# Patient Record
Sex: MALE | Race: WHITE | NOT HISPANIC OR LATINO | Employment: UNEMPLOYED | URBAN - METROPOLITAN AREA
[De-identification: names, ages, dates, MRNs, and addresses within clinical notes are randomized per-mention and may not be internally consistent; named-entity substitution may affect disease eponyms.]

---
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
## 2021-04-13 DIAGNOSIS — Z23 ENCOUNTER FOR IMMUNIZATION: ICD-10-CM

## 2021-05-12 ENCOUNTER — OFFICE VISIT (OUTPATIENT)
Dept: FAMILY MEDICINE CLINIC | Facility: CLINIC | Age: 21
End: 2021-05-12
Payer: COMMERCIAL

## 2021-05-12 VITALS
HEART RATE: 62 BPM | HEIGHT: 70 IN | OXYGEN SATURATION: 99 % | WEIGHT: 143.8 LBS | RESPIRATION RATE: 16 BRPM | TEMPERATURE: 97.3 F | SYSTOLIC BLOOD PRESSURE: 124 MMHG | DIASTOLIC BLOOD PRESSURE: 70 MMHG | BODY MASS INDEX: 20.59 KG/M2

## 2021-05-12 DIAGNOSIS — M79.671 BILATERAL FOOT PAIN: Primary | ICD-10-CM

## 2021-05-12 DIAGNOSIS — M79.672 BILATERAL FOOT PAIN: Primary | ICD-10-CM

## 2021-05-12 PROCEDURE — 3008F BODY MASS INDEX DOCD: CPT | Performed by: FAMILY MEDICINE

## 2021-05-12 PROCEDURE — 3725F SCREEN DEPRESSION PERFORMED: CPT | Performed by: FAMILY MEDICINE

## 2021-05-12 PROCEDURE — 99213 OFFICE O/P EST LOW 20 MIN: CPT | Performed by: FAMILY MEDICINE

## 2021-05-12 PROCEDURE — 1036F TOBACCO NON-USER: CPT | Performed by: FAMILY MEDICINE

## 2021-05-17 ENCOUNTER — IMMUNIZATIONS (OUTPATIENT)
Dept: FAMILY MEDICINE CLINIC | Facility: HOSPITAL | Age: 21
End: 2021-05-17

## 2021-05-17 DIAGNOSIS — Z23 ENCOUNTER FOR IMMUNIZATION: Primary | ICD-10-CM

## 2021-05-17 PROCEDURE — 0011A SARS-COV-2 / COVID-19 MRNA VACCINE (MODERNA) 100 MCG: CPT

## 2021-05-17 PROCEDURE — 91301 SARS-COV-2 / COVID-19 MRNA VACCINE (MODERNA) 100 MCG: CPT

## 2021-05-17 NOTE — PROGRESS NOTES
Assessment/Plan:    Reviewed multiple etiologies for bilateral foot pain  Recommend x-rays both feet  Refer to physical therapy  Patient prefers [facility] as he just had shoulder rehab there after surgery  If not improving then will need podiatric evaluation or orthopedic foot specialist evaluation  He will keep me updated  Diagnoses and all orders for this visit:    Bilateral foot pain  -     Ambulatory referral to Physical Therapy; Future  -     XR foot 3+ vw left; Future  -     XR foot 3+ vw right; Future        1  Bilateral foot pain  Ambulatory referral to Physical Therapy    XR foot 3+ vw left    XR foot 3+ vw right       Subjective:        Patient ID: Sandra Fournier is a 24 y o  male  Chief Complaint   Patient presents with    Follow-up     follow up to foot pain          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 [employer]  Complains of bilateral foot pain for greater than 3 months  No injury  The following portions of the patient's history were reviewed and updated as appropriate: past medical history, past surgical history and problem list       Review of Systems   Constitutional: Negative for fatigue  Eyes: Negative for visual disturbance  Respiratory: Negative for cough and shortness of breath  Cardiovascular: Negative for chest pain, palpitations and leg swelling  Gastrointestinal: Negative for abdominal pain  Musculoskeletal:        Bilateral foot pain   Neurological: Negative for dizziness and headaches  Psychiatric/Behavioral: The patient is not nervous/anxious  Objective:  /70   Pulse 62   Temp (!) 97 3 °F (36 3 °C) (Temporal)   Resp 16   Ht 5' 10" (1 778 m)   Wt 65 2 kg (143 lb 12 8 oz)   SpO2 99%   BMI 20 63 kg/m²        Physical Exam  Vitals signs and nursing note reviewed  Constitutional:       General: He is not in acute distress  HENT:      Head: Normocephalic  Eyes:      General: No scleral icterus       Pupils: Pupils are equal, round, and reactive to light  Cardiovascular:      Heart sounds: Normal heart sounds  No murmur  Pulmonary:      Breath sounds: Normal breath sounds  Musculoskeletal:      Right lower leg: No edema  Left lower leg: No edema  Comments: Good pulses  Slight pes planus on right foot  Range of motion normal    Lymphadenopathy:      Cervical: No cervical adenopathy  Skin:     Coloration: Skin is not pale  Neurological:      Mental Status: He is alert and oriented to person, place, and time  Deep Tendon Reflexes: Reflexes normal    Psychiatric:         Behavior: Behavior normal          Thought Content:  Thought content normal

## 2021-06-01 ENCOUNTER — APPOINTMENT (OUTPATIENT)
Dept: RADIOLOGY | Facility: MEDICAL CENTER | Age: 21
End: 2021-06-01
Payer: COMMERCIAL

## 2021-06-01 DIAGNOSIS — M79.671 BILATERAL FOOT PAIN: ICD-10-CM

## 2021-06-01 DIAGNOSIS — M79.672 BILATERAL FOOT PAIN: ICD-10-CM

## 2021-06-01 PROCEDURE — 73630 X-RAY EXAM OF FOOT: CPT

## 2021-06-19 ENCOUNTER — IMMUNIZATIONS (OUTPATIENT)
Dept: FAMILY MEDICINE CLINIC | Facility: HOSPITAL | Age: 21
End: 2021-06-19

## 2021-06-19 DIAGNOSIS — Z23 ENCOUNTER FOR IMMUNIZATION: Primary | ICD-10-CM

## 2021-06-19 PROCEDURE — 91301 SARS-COV-2 / COVID-19 MRNA VACCINE (MODERNA) 100 MCG: CPT

## 2021-06-19 PROCEDURE — 0012A SARS-COV-2 / COVID-19 MRNA VACCINE (MODERNA) 100 MCG: CPT

## 2021-11-18 ENCOUNTER — TELEPHONE (OUTPATIENT)
Dept: FAMILY MEDICINE CLINIC | Facility: CLINIC | Age: 21
End: 2021-11-18

## 2022-01-14 ENCOUNTER — TELEMEDICINE (OUTPATIENT)
Dept: FAMILY MEDICINE CLINIC | Facility: CLINIC | Age: 22
End: 2022-01-14
Payer: COMMERCIAL

## 2022-01-14 DIAGNOSIS — Z20.822 EXPOSURE TO COVID-19 VIRUS: ICD-10-CM

## 2022-01-14 DIAGNOSIS — B34.9 VIRAL INFECTION, UNSPECIFIED: ICD-10-CM

## 2022-01-14 PROCEDURE — U0005 INFEC AGEN DETEC AMPLI PROBE: HCPCS | Performed by: NURSE PRACTITIONER

## 2022-01-14 PROCEDURE — U0003 INFECTIOUS AGENT DETECTION BY NUCLEIC ACID (DNA OR RNA); SEVERE ACUTE RESPIRATORY SYNDROME CORONAVIRUS 2 (SARS-COV-2) (CORONAVIRUS DISEASE [COVID-19]), AMPLIFIED PROBE TECHNIQUE, MAKING USE OF HIGH THROUGHPUT TECHNOLOGIES AS DESCRIBED BY CMS-2020-01-R: HCPCS | Performed by: NURSE PRACTITIONER

## 2022-01-14 PROCEDURE — 99213 OFFICE O/P EST LOW 20 MIN: CPT | Performed by: NURSE PRACTITIONER

## 2022-01-14 NOTE — PROGRESS NOTES
COVID-19 Outpatient Progress Note    Assessment/Plan:    Problem List Items Addressed This Visit     None      Visit Diagnoses     Exposure to COVID-19 virus        Relevant Orders    COVID Only- Collected at [facility]    Viral infection, unspecified        Relevant Orders    COVID Only- Collected at [facility]         Disposition:     Referred patient to centralized site to test for COVID-19  Advised patient to stay in isolation (not leaving home unless to seek urgent medical care) until results discussed with patient with further instruction  Discussed important of wearing mask around household members, avoiding contact with household members and cleaning surfaces frequently  Discussed important of monitoring temperature and symptoms  Call if any changes or worsening in symptoms, especially any changes with respiratory related symptoms  Please call the office if you are experiencing any worsening of symptoms or no symptom improvement  I have spent 15 minutes directly with the patient  Greater than 50% of this time was spent in counseling/coordination of care regarding: instructions for management, patient and family education and impressions  Encounter provider Ruth Huizar    Provider located at [address] [place] [address] PRIMARY CARE  [address] [facility] [address]  [id number] [facility] [id number] [phone]    Recent Visits  No visits were found meeting these conditions  Showing recent visits within past 7 days and meeting all other requirements  Today's Visits  Date Type Provider Dept   01/14/22 Telemedicine Prosper Stanford, [facility]   Showing today's visits and meeting all other requirements  Future Appointments  No visits were found meeting these conditions    Showing future appointments within next 150 days and meeting all other requirements     This virtual check-in was done via [employer] [facility] and patient was informed that this is a secure, HIPAA-compliant platform  He agrees to proceed  Patient agrees to participate in a virtual check in via telephone or video visit instead of presenting to the office to address urgent/immediate medical needs  Patient is aware this is a billable service  After connecting through [facility], the patient was identified by name and date of birth  Irasema Ibarra was informed that this was a telemedicine visit and that the exam was being conducted confidentially over secure lines  My office door was closed  No one else was in the room  Irasema Ibarra acknowledged consent and understanding of privacy and security of the telemedicine visit  I informed the patient that I have reviewed his record in Epic and presented the opportunity for him to ask any questions regarding the visit today  The patient agreed to participate  Verification of patient location:  Patient is located in the following state in which I hold an active license: [place]    Subjective:   Irasema Ibarra is a 22 y o  male who is concerned about COVID-19  Patient's symptoms include chills, fatigue, sore throat (yesterday), cough, myalgias and headache  Patient denies fever, malaise, congestion, rhinorrhea, anosmia, loss of taste, shortness of breath, chest tightness, abdominal pain, nausea (stomach ache this morning but had gingerale and that resolved), vomiting and diarrhea       - Date of symptom onset: 1/13/2022  - Date of exposure: 1/9/2022      COVID-19 vaccination status: Fully vaccinated (primary series)    Exposure:   Contact with a person who is under investigation (PUI) for or who is positive for COVID-19 within the last 14 days?: Yes    Hospitalized recently for fever and/or lower respiratory symptoms?: No      Currently a healthcare worker that is involved in direct patient care?: No      Works in a special setting where the risk of COVID-19 transmission may be high? (this may include long-term care, correctional and FPC facilities; homeless shelters; assisted-living facilities and group homes ): No      Resident in a special setting where the risk of COVID-19 transmission may be high? (this may include long-term care, correctional and FPC facilities; homeless shelters; assisted-living facilities and group homes ): No      covid test done at home yesterday was negative  Lab Results   Component Value Date    SARSCOV2 Negative 03/12/2021    SARSCOV2 Negative 01/23/2021     No past medical history on file  Past Surgical History:   Procedure Laterality Date    NO PAST SURGERIES      SHOULDER SURGERY       No current outpatient medications on file  No current facility-administered medications for this visit  No Known Allergies    Review of Systems   Constitutional: Positive for chills and fatigue  Negative for fever  HENT: Positive for sore throat (yesterday)  Negative for congestion and rhinorrhea  Respiratory: Positive for cough  Negative for chest tightness and shortness of breath  Gastrointestinal: Negative for abdominal pain, diarrhea, nausea (stomach ache this morning but had gingerale and that resolved) and vomiting  Musculoskeletal: Positive for myalgias  Neurological: Positive for headaches  Objective: There were no vitals filed for this visit  Physical Exam  Constitutional:       General: He is not in acute distress  Appearance: Normal appearance  He is not ill-appearing, toxic-appearing or diaphoretic  HENT:      Head: Normocephalic and atraumatic  Eyes:      General: No scleral icterus  Pulmonary:      Effort: Pulmonary effort is normal  No respiratory distress  Skin:     Coloration: Skin is not pale  Neurological:      Mental Status: He is alert and oriented to person, place, and time  Psychiatric:         Mood and Affect: Mood normal          VIRTUAL VISIT DISCLAIMER    Ayah Luz verbally agrees to participate in [employer]   Pt is aware that Virtual Care Services could be limited without vital signs or the ability to perform a full hands-on physical exam  Oswald Anna Aguilar understands he or the provider may request at any time to terminate the video visit and request the patient to seek care or treatment in person

## 2022-06-21 ENCOUNTER — OFFICE VISIT (OUTPATIENT)
Dept: FAMILY MEDICINE CLINIC | Facility: CLINIC | Age: 22
End: 2022-06-21
Payer: COMMERCIAL

## 2022-06-21 ENCOUNTER — TELEPHONE (OUTPATIENT)
Dept: FAMILY MEDICINE CLINIC | Facility: CLINIC | Age: 22
End: 2022-06-21

## 2022-06-21 VITALS
HEIGHT: 70 IN | TEMPERATURE: 97 F | HEART RATE: 64 BPM | WEIGHT: 146.6 LBS | BODY MASS INDEX: 20.99 KG/M2 | DIASTOLIC BLOOD PRESSURE: 80 MMHG | OXYGEN SATURATION: 97 % | SYSTOLIC BLOOD PRESSURE: 120 MMHG | RESPIRATION RATE: 16 BRPM

## 2022-06-21 DIAGNOSIS — M89.9 SESAMOID PAIN: ICD-10-CM

## 2022-06-21 DIAGNOSIS — Z01.818 PRE-OP EXAMINATION: Primary | ICD-10-CM

## 2022-06-21 PROCEDURE — 3008F BODY MASS INDEX DOCD: CPT | Performed by: NURSE PRACTITIONER

## 2022-06-21 PROCEDURE — 99214 OFFICE O/P EST MOD 30 MIN: CPT | Performed by: NURSE PRACTITIONER

## 2022-06-21 PROCEDURE — 3725F SCREEN DEPRESSION PERFORMED: CPT | Performed by: NURSE PRACTITIONER

## 2022-06-21 PROCEDURE — 93000 ELECTROCARDIOGRAM COMPLETE: CPT | Performed by: NURSE PRACTITIONER

## 2022-06-21 PROCEDURE — 1036F TOBACCO NON-USER: CPT | Performed by: NURSE PRACTITIONER

## 2022-06-21 RX ORDER — IBUPROFEN 600 MG/1
TABLET ORAL
COMMUNITY
Start: 2022-06-20 | End: 2022-06-21

## 2022-06-21 RX ORDER — HYDROCODONE BITARTRATE AND ACETAMINOPHEN 5; 325 MG/1; MG/1
TABLET ORAL
COMMUNITY
Start: 2022-06-20 | End: 2022-06-21

## 2022-06-21 NOTE — TELEPHONE ENCOUNTER
I spoke to Galen Ybarra at [place] [address], he is going to send a message to the surgery coordinator to determine type of anesthesia for patient's upcoming surgery  Will await return call with info

## 2022-06-21 NOTE — PROGRESS NOTES
Subjective:      Jose Manuel Day is a 22 y o  male who presents to the office today for a preoperative consultation at the request of surgeon Dr Carolyn Van at [facility] who plans on performing excision of tibial sesamoid of left food on July 5  Planned anesthesia is choice  The patient has the following known anesthesia issues: no personal or family hx of adverse reactions with anesthesia  Patient has a bleeding risk of: no recent abnormal bleeding  Hx COVID January 2022, symptoms completely resolved  Asymptomatic today  EKG and labs done 6/20/22 at [facility], not in [address] [place] [address] yet but was able to review on patient's phone under his MyChart through [facility]  EKG sinus bradycardia, moderate voltage criteria for LVH may be normal variant, borderline  Patient states his phone was in his pocket during the EKG  CBC and BMP WNL    Review of Systems  Review of Systems   Constitutional: Negative for chills and fever  Eyes: Negative for discharge  Respiratory: Negative for shortness of breath  Cardiovascular: Negative for chest pain  Gastrointestinal: Negative for constipation and diarrhea  Genitourinary: Negative for difficulty urinating  Musculoskeletal: Negative for joint swelling  Skin: Negative for rash  Neurological: Negative for headaches  Hematological: Negative for adenopathy  Psychiatric/Behavioral: The patient is not nervous/anxious  Objective:      Physical Exam    /80   Pulse 64   Temp (!) 97 °F (36 1 °C) (Temporal)   Resp 16   Ht 5' 10 47" (1 79 m)   Wt 66 5 kg (146 lb 9 6 oz)   SpO2 97%   BMI 20 75 kg/m²     Physical Exam  Vitals and nursing note reviewed  Constitutional:       General: He is not in acute distress  Appearance: He is well-developed  He is not diaphoretic  HENT:      Head: Normocephalic and atraumatic        Right Ear: External ear normal       Left Ear: External ear normal    Eyes:      General: Lids are normal          Right eye: No discharge  Left eye: No discharge  Conjunctiva/sclera: Conjunctivae normal    Cardiovascular:      Rate and Rhythm: Normal rate and regular rhythm  Heart sounds: No murmur heard  Pulmonary:      Effort: Pulmonary effort is normal  No respiratory distress  Breath sounds: Normal breath sounds  No wheezing  Musculoskeletal:         General: No deformity  Cervical back: Neck supple  Skin:     General: Skin is warm and dry  Neurological:      Mental Status: He is alert and oriented to person, place, and time  Psychiatric:         Speech: Speech normal          Behavior: Behavior normal          Thought Content: Thought content normal          Judgment: Judgment normal          Depression Screening and Follow-up Plan: Patient was screened for depression during today's encounter  They screened negative with a PHQ-2 score of 0  Predictors of intubation difficulty:  Morbid obesity? no  Anatomically abnormal facies? no  Short, thick neck? no  Neck range of motion: normal    Cardiographics  EC22  Sinus bradycardia rate 59 bpm, no prior for comparison  Imaging  None indicated per surgeon  Lab Review   CBC and BMP WNL on 22      Assessment:      22 y o  male with planned surgery as above  Known risk factors for perioperative complications: None        Can walk 2 blocks without difficulty: yes   Can walk up 2 flights of stairs without difficulty: yes      1  Pre-op examination  POCT ECG   2  Sesamoid pain            Plan:        No problem-specific Assessment & Plan notes found for this encounter  Patient is medically cleared for surgery  Patient has no known allergies  History reviewed  No pertinent past medical history  Past Surgical History:   Procedure Laterality Date    NO PAST SURGERIES      SHOULDER SURGERY       There is no problem list on file for this patient      Social History     Socioeconomic History    Marital status: Single Spouse name: None    Number of children: None    Years of education: None    Highest education level: None   Occupational History    None   Tobacco Use    Smoking status: Never Smoker    Smokeless tobacco: Never Used    Tobacco comment: smoke free home   Substance and Sexual Activity    Alcohol use: No    Drug use: No    Sexual activity: None   Other Topics Concern    None   Social History Narrative    None     Social Determinants of Health     Financial Resource Strain: Not on file   Food Insecurity: Not on file   Transportation Needs: Not on file   Physical Activity: Not on file   Stress: Not on file   Social Connections: Not on file   Intimate Partner Violence: Not on file   Housing Stability: Not on file     No current outpatient medications on file  Lab Results   Component Value Date    WBC 8 1 12/07/2017    HGB 16 1 12/07/2017    HCT 47 3 12/07/2017    MCV 86 2 12/07/2017     12/07/2017     Lab Results   Component Value Date     12/07/2017    K 4 6 12/07/2017     12/07/2017    CO2 30 12/07/2017    BUN 11 12/07/2017    CREATININE 0 95 12/07/2017    CALCIUM 9 8 12/07/2017    AST 13 12/07/2017    ALT 7 (L) 12/07/2017    ALKPHOS 152 12/07/2017    PROT 7 5 12/07/2017    BILITOT 0 9 12/07/2017     No results found for: [address]        [unfilled]  No current outpatient medications on file    No Known Allergies

## 2022-06-21 NOTE — TELEPHONE ENCOUNTER
----- Message from [address] sent at 6/21/2022 10:25 AM EDT -----  Upcoming appointment [facility] [phone] Dr Tidwell Skill, I need to know type of anesthesia please

## 2022-06-22 NOTE — TELEPHONE ENCOUNTER
Surgery scheduler from [facility]  The anesthesia is general and he is also going to be getting a nerve block in the surgery area  [phone] if their fax number

## 2022-07-11 DIAGNOSIS — Z02.5 SPORTS PHYSICAL: Primary | ICD-10-CM

## 2022-12-14 ENCOUNTER — APPOINTMENT (OUTPATIENT)
Dept: RADIOLOGY | Facility: OTHER | Age: 22
End: 2022-12-14

## 2022-12-14 ENCOUNTER — OFFICE VISIT (OUTPATIENT)
Dept: OBGYN CLINIC | Facility: OTHER | Age: 22
End: 2022-12-14

## 2022-12-14 VITALS
HEART RATE: 66 BPM | HEIGHT: 70 IN | WEIGHT: 151.2 LBS | SYSTOLIC BLOOD PRESSURE: 125 MMHG | DIASTOLIC BLOOD PRESSURE: 77 MMHG | BODY MASS INDEX: 21.64 KG/M2

## 2022-12-14 DIAGNOSIS — S69.91XA RIGHT WRIST INJURY, INITIAL ENCOUNTER: ICD-10-CM

## 2022-12-14 DIAGNOSIS — S52.501A TRAUMATIC CLOSED NONDISPLACED FRACTURE OF DISTAL END OF RIGHT RADIUS, INITIAL ENCOUNTER: ICD-10-CM

## 2022-12-14 DIAGNOSIS — S69.91XA RIGHT WRIST INJURY, INITIAL ENCOUNTER: Primary | ICD-10-CM

## 2022-12-14 NOTE — PROGRESS NOTES
Assessment:       1  Right wrist injury, initial encounter    2  Traumatic closed nondisplaced fracture of distal end of right radius, initial encounter          Plan:        I explained my current clinical findings and reviewed radiological findings with Rosan Light today  He has sustained a nondisplaced intra-articular right distal radius fracture  Anatomical alignment is well-maintained  In this regard I suggest having a short arm cast immobilization for a period of 6 weeks  I will see him back in about 2 weeks time for radiological reassessment in the cast   He is suggested not to do any pulling, pushing or weight lifting type activities from the right upper extremity until cleared  Cast care precautions were explained and printed handout was provided in this regard  Subjective:     Patient ID: Tomasa Allison is a 25 y o  male  Chief Complaint: Right wrist pain    HPI  Rafael Lazaro is a 67-year-old male who presents today for evaluation of right wrist injury  He is a track and field runner at [employer]  He was running a 60 m sprint on 12/9/2022 and felt his hamstring tighten up after which he fell forward  He is unclear about the exact mechanism of injury but reports that he likely landed on his right outstretched hand  He has been placed in a short thermoplastic radial gutter splint by the   There is no known history of previous right wrist injury or surgery  He reports pain to be on the radial aspect of his right wrist, this is nonradiating and made worse with wrist movement or pressure in this region  He denies any distal tingling or numbness of the right hand digits  No past medical history on file    Past Surgical History:   Procedure Laterality Date   • NO PAST SURGERIES     • SHOULDER SURGERY         Social History     Occupational History   • Not on file   Tobacco Use   • Smoking status: Never   • Smokeless tobacco: Never   • Tobacco comments:     smoke free home   Substance and Sexual Activity   • Alcohol use: No   • Drug use: No   • Sexual activity: Not on file      Review of Systems        Objective:     Ortho ExamPhysical Exam  Vitals and nursing note reviewed  Constitutional:       Appearance: He is well-developed  HENT:      Head: Normocephalic and atraumatic  Eyes:      Conjunctiva/sclera: Conjunctivae normal    Cardiovascular:      Rate and Rhythm: Normal rate and regular rhythm  Pulmonary:      Effort: Pulmonary effort is normal  No respiratory distress  Skin:     General: Skin is warm  Findings: No erythema  Neurological:      Mental Status: He is alert and oriented to person, place, and time  Psychiatric:         Behavior: Behavior normal          Thought Content: Thought content normal          Judgment: Judgment normal          Right wrist exam:    No visible swelling or deformity  There is some tenderness to palpation over the radial styloid  There is significant tenderness to palpation over the anatomical snuffbox  There is no significant discomfort with axial loading of the right thumb  There is also no tenderness over the volar scaphoid tubercle  No other areas of carpus or hand tenderness  Clinically intact distal neurovascular status  I have personally reviewed pertinent films in PACS and my interpretation is Radiograph of the right wrist performed on today's office visit reveals a nondisplaced intra-articular distal radius fracture  Cast application    Date/Time: 12/14/2022 10:10 AM  Performed by: Williams Benjamin MD  Authorized by: Williams Benjamin MD   Universal Protocol:  Procedure performed by: Lisbeth Lira MA )  Consent: Verbal consent obtained  Risks and benefits: risks, benefits and alternatives were discussed  Consent given by: patient  Patient understanding: patient states understanding of the procedure being performed  Site marked: the operative site was marked  Radiology Images displayed and confirmed   If images not available, report reviewed: imaging studies available  Required items: required blood products, implants, devices, and special equipment available  Patient identity confirmed: verbally with patient      Pre-procedure details:     Sensation:  Normal  Procedure details:     Laterality:  Right    Location:  Wrist    Wrist:  R wristCast type:  Short arm      Supplies:  Cotton padding and fiberglass  Post-procedure details:     Pain:  Improved    Sensation:  Normal    Patient tolerance of procedure:   Tolerated well, no immediate complications

## 2022-12-14 NOTE — PATIENT INSTRUCTIONS
Cast Care   WHAT YOU NEED TO KNOW:   Cast care will help the cast dry and harden correctly, and then protect it until it comes off  Your cast may need up to 48 hours to dry and harden completely  Even after your cast hardens, it can be damaged  DISCHARGE INSTRUCTIONS:   Return to the emergency department if:   Your cast breaks or gets damaged  You see drainage, or your cast is stained or smells bad  Your skin turns blue or pale  Your skin tingles, burns, or is cold or numb  You have severe pain that is getting worse and does not go away after you take pain medicine  Your limb swells, or your cast looks or feels tighter than it was before  Contact your healthcare provider if:   Something falls into your cast and gets stuck  You have itching, pain, burning, or weakness where you have the cast      You have a fever  You have sores, blisters, or breaks on the skin around the edges of the cast     You have questions or concerns about your condition or care  Follow up with your healthcare provider as directed: You will need to return to have your cast removed and your bones checked  Write down your questions so you remember to ask them during your visits  Care for your cast while it hardens:   Protect the cast   Do not put weight on the cast  Do not bend, lean on, or hit the cast with anything  Use the palms of your hands when you move the cast  Do not use your fingers  Your fingers may leave marks on the cast as it dries  Change positions often  Change your position every 2 hours to help the cast dry faster  Prop your cast on something soft, such as a pillow, to prevent a flat area on your cast      Keep the cast dry  Tie plastic trash bags around your cast to keep it dry while you bathe  You may use a blow dryer on cool or the lowest heat setting to dry your cast if it gets wet  Do not use a high heat setting, because you may burn your skin  Certain casts can get wet   Ask if you have a waterproof cast     Care for your cast after it hardens:   Check your cast every day  Contact your healthcare provider if you notice any cracks, dents, holes, or flaking on your cast      Keep your cast clean and dry  Cover your cast with a towel when you eat  You may have a small piece of cast that can be removed to check on incisions under your cast  Make sure the small piece of cast is kept tightly closed  If your cast gets dirty, use a mild detergent and a damp washcloth to wipe off the outside of your cast  Continue to cover your cast with trash bags to keep it dry while you bathe  Care for the edges of your cast   Cover the cast edges to keep them smooth  Use 4 inch pieces of waterproof tape  Place one end of the tape under the inside edge of your cast and fold it over to the outside surface  Overlap tape strips until the edges are completely covered  Change the tape as directed  Do not pull or repair any of the padding from inside the cast  This could cause blisters and sores on the skin under your cast      Keep weight off your cast   Do not let anyone push down or lean on your cast  This may cause it to break  Do not use sharp objects  Do not use a sharp or pointed object to scratch under your cast  This may cause wounds that can get infected, or you may lose the item inside the cast  If your skin itches, blow cool air under the cast  You may also gently scratch your skin outside the cast with a cloth  © Copyright [employer] 2022 Information is for End User's use only and may not be sold, redistributed or otherwise used for commercial purposes  All illustrations and images included in CareNotes® are the copyrighted property of A D A M , Inc  or [employer] Infante   The above information is an  only  It is not intended as medical advice for individual conditions or treatments   Talk to your doctor, nurse or pharmacist before following any medical regimen to see if it is safe and effective for you

## 2023-01-06 ENCOUNTER — OFFICE VISIT (OUTPATIENT)
Dept: OBGYN CLINIC | Facility: OTHER | Age: 23
End: 2023-01-06

## 2023-01-06 ENCOUNTER — APPOINTMENT (OUTPATIENT)
Dept: RADIOLOGY | Facility: OTHER | Age: 23
End: 2023-01-06

## 2023-01-06 VITALS
HEIGHT: 70 IN | WEIGHT: 152 LBS | SYSTOLIC BLOOD PRESSURE: 114 MMHG | DIASTOLIC BLOOD PRESSURE: 77 MMHG | BODY MASS INDEX: 21.76 KG/M2 | HEART RATE: 64 BPM

## 2023-01-06 DIAGNOSIS — S52.501D CLOSED TRAUMATIC NONDISPLACED FRACTURE OF DISTAL END OF RIGHT RADIUS WITH ROUTINE HEALING, SUBSEQUENT ENCOUNTER: Primary | ICD-10-CM

## 2023-01-06 DIAGNOSIS — S69.91XD RIGHT WRIST INJURY, SUBSEQUENT ENCOUNTER: ICD-10-CM

## 2023-01-06 DIAGNOSIS — S52.501D CLOSED TRAUMATIC NONDISPLACED FRACTURE OF DISTAL END OF RIGHT RADIUS WITH ROUTINE HEALING, SUBSEQUENT ENCOUNTER: ICD-10-CM

## 2023-01-09 NOTE — PROGRESS NOTES
Assessment:       1  Closed traumatic nondisplaced fracture of distal end of right radius with routine healing, subsequent encounter    2  Right wrist injury, subsequent encounter          Plan:        Explained my current clinical findings and reviewed radiological findings with the patient today  He has been treated with cast immobilization over the last 3-1/2 weeks  Suggest to continue with cast immobilization for further 3-weeks  We will follow-up thereafter and repeat radiographs after cast removal   May need PT/OT hand therapy if there is any post immobilization stiffness or significant weakness  Patient expressed understanding and agreement with the plan  Subjective:     Patient ID: Robert Gomez is a 25 y o  male  Chief Complaint:    HPI  Krish Chapman is here today for a follow-up of his right distal radius nondisplaced fracture sustained on 12/9/2022  He has been treated conservatively in a short arm cast since 12/14/2022  Denies any new injury  Denies any new onset tingling or numbness of the right hand digits  Social History     Occupational History   • Not on file   Tobacco Use   • Smoking status: Never   • Smokeless tobacco: Never   • Tobacco comments:     smoke free home   Substance and Sexual Activity   • Alcohol use: No   • Drug use: No   • Sexual activity: Not on file      Review of Systems        Objective:     Ortho ExamPhysical Exam  Vitals and nursing note reviewed  Constitutional:       Appearance: He is well-developed  HENT:      Head: Normocephalic and atraumatic  Eyes:      Conjunctiva/sclera: Conjunctivae normal    Cardiovascular:      Rate and Rhythm: Normal rate and regular rhythm  Pulmonary:      Effort: Pulmonary effort is normal  No respiratory distress  Skin:     General: Skin is warm  Findings: No erythema  Neurological:      Mental Status: He is alert and oriented to person, place, and time     Psychiatric:         Behavior: Behavior normal  Thought Content: Thought content normal          Judgment: Judgment normal          Right upper extremity is in a short arm cast which is in good condition  I have personally reviewed pertinent films in PACS and my interpretation is Plain radiograph of the right wrist performed today in the cast reveals a maintained alignment of healing distal radius intra-articular fracture

## 2023-01-30 ENCOUNTER — OFFICE VISIT (OUTPATIENT)
Dept: OBGYN CLINIC | Facility: OTHER | Age: 23
End: 2023-01-30

## 2023-01-30 ENCOUNTER — APPOINTMENT (OUTPATIENT)
Dept: RADIOLOGY | Facility: OTHER | Age: 23
End: 2023-01-30

## 2023-01-30 VITALS
DIASTOLIC BLOOD PRESSURE: 84 MMHG | WEIGHT: 149 LBS | SYSTOLIC BLOOD PRESSURE: 125 MMHG | HEART RATE: 69 BPM | BODY MASS INDEX: 21.33 KG/M2 | HEIGHT: 70 IN

## 2023-01-30 DIAGNOSIS — S52.501D CLOSED TRAUMATIC NONDISPLACED FRACTURE OF DISTAL END OF RIGHT RADIUS WITH ROUTINE HEALING, SUBSEQUENT ENCOUNTER: Primary | ICD-10-CM

## 2023-01-30 DIAGNOSIS — S52.501D CLOSED TRAUMATIC NONDISPLACED FRACTURE OF DISTAL END OF RIGHT RADIUS WITH ROUTINE HEALING, SUBSEQUENT ENCOUNTER: ICD-10-CM

## 2023-01-30 NOTE — PROGRESS NOTES
Chief Complaint: follow up of closed traumatic nondisplaced fracture of distal end of right radius    HPI:    Elizabeth Garg is a 25y o  year old Male who presents today for follow up of closed traumatic nondisplaced fracture of distal end of right radius     Athlete: Yes, describe: [employer]    Injury related: Yes, DOI: 12/09/2022   FUI: 6 wks 5 days     Description of symptoms:   Previous visit on 01/06/2023, Tx plan thus far consisted of cast immobilization for 6 wks and 5 days  Today's Visit: patient presents for follow up from fracture  Notes stiffness but no pain  Summary of treatment to-date:  Cast therapy 7 weeks  I have personally reviewed pertinent films in PACS and my interpretation is Well-healed nondisplaced distal radius fracture  Patient Active Problem List   Diagnosis   • Traumatic closed nondisplaced fracture of distal end of right radius, initial encounter        No current outpatient medications on file prior to visit  No current facility-administered medications on file prior to visit  No Known Allergies     Tobacco Use: Low Risk    • Smoking Tobacco Use: Never   • Smokeless Tobacco Use: Never   • Passive Exposure: Not on file        Social Determinants of Health     Tobacco Use: Low Risk    • Smoking Tobacco Use: Never   • Smokeless Tobacco Use: Never   • Passive Exposure: Not on file   Alcohol Use: Not on file   Financial Resource Strain: Not on file   Food Insecurity: Not on file   Transportation Needs: Not on file   Physical Activity: Not on file   Stress: Not on file   Social Connections: Not on file   Intimate Partner Violence: Not on file   Depression: Not at risk   • PHQ-2 Score: 0   Housing Stability: Not on file               Review of Systems     Body mass index is 21 38 kg/m²       Physical Exam     Ortho Exam:    Body part: right wrist    Inspection: No gross deformity, no ecchymosis, no erythema, no warmth, no swelling, dry yellow cracked skin noted on volar palm without concerns for infection or drainage at this time    Palpation: Nontender    Range of motion: Full active supination and pronation, over 60 degrees of dorsiflexion  20 degrees of volar flexion     Strength:    strength intact     Special Tests: 1st CMC circumduction without pain     Distal Neurovascular Status: Intact, Yes    Procedures       Assessment:     Diagnosis ICD-10-CM Associated Orders   1  Closed traumatic nondisplaced fracture of distal end of right radius with routine healing, subsequent encounter  S52 501D XR wrist 3+ vw right     Ambulatory Referral to PT/OT Hand Therapy           Plan:    We discussed clinical examination and findings with Thomas Jones  Reviewed radiographic imaging obtained today  Clinical presentation and findings consistent with well-healed nondisplaced distal radial fracture  Cast was removed  Referral given for PT/OT hand therapy  Given note for athletics  Limitations:   No heavy lifting and/or push-ups over the next 4 to 6 weeks  We will follow-up with patient in [employer] athletic training room in 2 - 3 weeks  Shared decision making, patient agreeable to plan  Follow-Up:    No follow-ups on file  Portions of the record may have been created with voice recognition software  Occasional wrong word or "sound alike" substitutions may have occurred due to the inherent limitations of voice recognition software  Please review the chart carefully and recognize, using context, where substitutions/typographical errors may have occurred

## 2023-01-30 NOTE — LETTER
January 30, 2023     Patient: Marion Hurt  YOB: 2000  Date of Visit: 1/30/2023      To Whom it May Concern:    Marion Hurt is under my professional care  Leobardo Deena was seen in my office on 1/30/2023  No heavy lifting or push-ups from right upper extremity over next 4-6 weeks  If you have any questions or concerns, please don't hesitate to call           Sincerely,          Trenton Bueno MD        CC: No Recipients

## 2023-02-06 ENCOUNTER — EVALUATION (OUTPATIENT)
Dept: PHYSICAL THERAPY | Facility: OTHER | Age: 23
End: 2023-02-06

## 2023-02-06 DIAGNOSIS — S52.501A TRAUMATIC CLOSED NONDISPLACED FRACTURE OF DISTAL END OF RIGHT RADIUS, INITIAL ENCOUNTER: ICD-10-CM

## 2023-02-06 DIAGNOSIS — S52.501D CLOSED TRAUMATIC NONDISPLACED FRACTURE OF DISTAL END OF RIGHT RADIUS WITH ROUTINE HEALING, SUBSEQUENT ENCOUNTER: Primary | ICD-10-CM

## 2023-02-06 NOTE — PROGRESS NOTES
PT Evaluation     Today's date: 2023  Patient name: Weston Collins  : 2000  MRN: [id number]  Referring provider: Namita Melara*  Dx:   Encounter Diagnosis     ICD-10-CM    1  Traumatic closed nondisplaced fracture of distal end of right radius, initial encounter  S52 501A       2  Closed traumatic nondisplaced fracture of distal end of right radius with routine healing, subsequent encounter  S52 501D Ambulatory Referral to PT/OT Hand Therapy          Start Time: 1400  Stop Time: 1435  Total time in clinic (min): 35 minutes    Assessment  Assessment details: Patient presents s/p 8 weeks right  distal radius fracture after 7 weeks of immobilization  Patient radiograph shows a well healed fracture with normal alignment  Patient clinical exam reveals limitations in wrist A/P ROM, weakness in wrist and hand strength including a 50% loss of  strength  Patient with a 10 degree improvement in wrist flexion and extension ROM after gentle stretching suggesting a fast recovery  Patients impairments limit his ability to exercise and participate as a track athelete, perform ADLs and lift heavy objects  Patient would benefit from physical therapy to address these impairments and assist in his return to normal function and athletics  Functional limitations: Difficulty gripping and lifting   Goals  Increase wrist flexion to 80 degree 3 weeks   Increase wrist extension to 70 degree 3 weeks   Increase wrist flexion/extension strength by one mmt 4 weeks   Increase  strength to 70 lbs 5 weeks     Patient able to perform a push up pain free 8 weeks   Patient able to participate in normal lifting session with track team without limitation 8 weeks       Plan  Planned therapy interventions: IADL retraining, joint mobilization, manual therapy, neuromuscular re-education, strengthening, therapeutic activities, therapeutic exercise and therapeutic training  Frequency: 2x week  Duration in weeks: 8        Subjective Evaluation    History of Present Illness  Date of onset: 2022  Mechanism of injury: Patient fell while running in a track meet in late December when he suffered a hamstring strain then fell on an outstretched arm  He fractured his distal radius and was casted for 6 weeks  Patient now reports mild wrist soreness, weakness in the hand and wrist and difficulty bearing weight through his arm  Pain  Current pain ratin  At best pain ratin  Quality: dull ache and sharp      Diagnostic Tests  X-ray: normal  Patient Goals  Patient goal: patient would like to be able to return to weight lifting for track without limiations           Objective     Active Range of Motion     Right Wrist   Wrist flexion: 45 degrees   Wrist extension: 45 degrees     Passive Range of Motion     Right Wrist   Wrist flexion: 55 degrees   Wrist extension: 55 degrees   Radial deviation: 15 degrees   Ulnar deviation: 20 degrees     Strength/Myotome Testing     Left Wrist/Hand      (2nd hand position)     Trial 1: 85    Right Wrist/Hand   Wrist extension: 4-  Wrist flexion: 4-     (2nd hand position)     Trial 1: 45    General Comments:    Upper quarter screen   Shoulder: unremarkable  Elbow: unremarkable             Precautions: none       Manuals 2/6                                                                Neuro Re-Ed                                                                                                        Ther Ex                          Putty  X 10            Putty pinch  X 10             Wrist ext/flex AAROm X 10 x 5"            wirst flex/ext  2 x 10 2lbs                                                   Ther Activity                                       Gait Training                                       Modalities

## 2023-02-08 ENCOUNTER — OFFICE VISIT (OUTPATIENT)
Dept: PHYSICAL THERAPY | Facility: OTHER | Age: 23
End: 2023-02-08

## 2023-02-08 DIAGNOSIS — S52.501D CLOSED TRAUMATIC NONDISPLACED FRACTURE OF DISTAL END OF RIGHT RADIUS WITH ROUTINE HEALING, SUBSEQUENT ENCOUNTER: Primary | ICD-10-CM

## 2023-02-08 DIAGNOSIS — S52.501A TRAUMATIC CLOSED NONDISPLACED FRACTURE OF DISTAL END OF RIGHT RADIUS, INITIAL ENCOUNTER: ICD-10-CM

## 2023-02-08 NOTE — PROGRESS NOTES
Daily Note     Today's date: 2023  Patient name: Carly Thompson  : 2000  MRN: [id number]  Referring provider: Namita Melara*  Dx:   Encounter Diagnosis     ICD-10-CM    1  Closed traumatic nondisplaced fracture of distal end of right radius with routine healing, subsequent encounter  S52 501D       2  Traumatic closed nondisplaced fracture of distal end of right radius, initial encounter  S52 501A           Start Time: 1355  Stop Time: 1430  Total time in clinic (min): 35 minutes    Subjective: Patient reports improvements in wrist pain and notes stretching is going well  Objective: See treatment diary below      Assessment: Tolerated treatment well  Patient demonstrated fatigue post treatment  Patient with improvement writ A/PROM  Patient tolerated strengthening well without complaint or issue  Plan: Continue per plan of care        Precautions: none       Manuals                         Carpal mobs  GIII-IV                                     Neuro Re-Ed                                                                                                        Ther Ex                          Putty  X 10            Putty pinch  X 10             Wrist ext/flex AAROm X 10 x 5" X 10 x 5"            wirst flex/ext  2 x 10 2lbs 3 x 10 3 lbs                                                   Ther Activity                                       Gait Training                                       Modalities

## 2023-02-15 ENCOUNTER — OFFICE VISIT (OUTPATIENT)
Dept: PHYSICAL THERAPY | Facility: OTHER | Age: 23
End: 2023-02-15

## 2023-02-15 DIAGNOSIS — S52.501D CLOSED TRAUMATIC NONDISPLACED FRACTURE OF DISTAL END OF RIGHT RADIUS WITH ROUTINE HEALING, SUBSEQUENT ENCOUNTER: Primary | ICD-10-CM

## 2023-02-15 NOTE — PROGRESS NOTES
Daily Note     Today's date: 2/15/2023  Patient name: Liz Coles  : 2000  MRN: [id number]  Referring provider: Namita Melara*  Dx:   Encounter Diagnosis     ICD-10-CM    1  Closed traumatic nondisplaced fracture of distal end of right radius with routine healing, subsequent encounter  S52 501D           Start Time: 1396  Stop Time: 1435  Total time in clinic (min): 40 minutes    Subjective:  Patient reports wrist continues to feel better and stretching is going well  Objective: See treatment diary below      Assessment: Tolerated treatment well  Patient demonstrated fatigue post treatment  Patient with improving ROM with only mild end rang pain  Patient tolerated some WBing doing push ups on an elevated surface with wrist pain improving throughout  Plan: Continue per plan of care        Precautions: none       Manuals  2/15 GI                      Carpal mobs  GIII-IV GIII-IV                                    Neuro Re-Ed                                                                                                        Ther Ex                          Putty  X 10            Putty pinch  X 10             Wrist ext/flex AAROm X 10 x 5" X 10 x 5"  MWM CC x 10 x 5"          wirst flex/ext  2 x 10 2lbs 3 x 10 3 lbs  3  X 15 3#          Push up on 24"   3 x 12           [place] row    3 x 12 24#          Shoulder press    3 x12 15#           Ther Activity                                       Gait Training                                       Modalities

## 2023-02-20 ENCOUNTER — OFFICE VISIT (OUTPATIENT)
Dept: PHYSICAL THERAPY | Facility: OTHER | Age: 23
End: 2023-02-20

## 2023-02-20 DIAGNOSIS — S52.501D CLOSED TRAUMATIC NONDISPLACED FRACTURE OF DISTAL END OF RIGHT RADIUS WITH ROUTINE HEALING, SUBSEQUENT ENCOUNTER: Primary | ICD-10-CM

## 2023-02-20 NOTE — PROGRESS NOTES
Daily Note     Today's date: 2023  Patient name: Nato Cervantes  : 2000  MRN: [id number]  Referring provider: Namita Melara*  Dx:   Encounter Diagnosis     ICD-10-CM    1  Closed traumatic nondisplaced fracture of distal end of right radius with routine healing, subsequent encounter  S52 501D           Start Time:   Stop Time: 1524  Total time in clinic (min): 42 minutes    Subjective:  Patient reports his wrist is feeling great but his foot is bother him more  Objective: See treatment diary below      Assessment: Tolerated treatment well  Patient demonstrated fatigue post treatment  Patient with mild wrist soreness after performing a set of 10 push ups on a 12 inch box but he was able to tolerate 18 inches without complaint  Continue to progress wrist weight bearing as tolerated  Plan: Continue per plan of care        Precautions: none       Manuals 2/6 2/8 2/15 2/20                      Carpal mobs  GIII-IV GIII-IV GIII-IV                                    Neuro Re-Ed                                                                                                        Ther Ex                          Putty  X 10            Putty pinch  X 10             Wrist ext/flex AAROm X 10 x 5" X 10 x 5"  MWM CC x 10 x 5" MWM CC 10 x 5"         wirst flex/ext  2 x 10 2lbs 3 x 10 3 lbs  3  X 15 3# 3 x 12          Push up on 24"   3 x 12  3 x 10         Ye row    3 x 12 24# 3 x 12 26#         Shoulder press    3 x12 15#  3 x12 20#         Ther Activity                                       Gait Training                                       Modalities

## 2023-03-01 ENCOUNTER — OFFICE VISIT (OUTPATIENT)
Dept: PHYSICAL THERAPY | Facility: OTHER | Age: 23
End: 2023-03-01

## 2023-03-01 DIAGNOSIS — S52.501D CLOSED TRAUMATIC NONDISPLACED FRACTURE OF DISTAL END OF RIGHT RADIUS WITH ROUTINE HEALING, SUBSEQUENT ENCOUNTER: Primary | ICD-10-CM

## 2023-03-01 NOTE — PROGRESS NOTES
Daily Note     Today's date: 3/1/2023  Patient name: Lesli Oleary  : 2000  MRN: [id number]  Referring provider: Namita Melara*  Dx:   Encounter Diagnosis     ICD-10-CM    1  Closed traumatic nondisplaced fracture of distal end of right radius with routine healing, subsequent encounter  S52 501D           Start Time:   Stop Time:   Total time in clinic (min): 45 minutes    Subjective:  Patient reports his wrist is feeling great but his foot is bother him more  Objective: See treatment diary below      Assessment: Tolerated treatment well  Patient demonstrated fatigue post treatment  Patient with only mild wrist soreness was able to tolerate 18 inches without complaint  Patient wrist ROM is progressing 80 degrees of flexion and 75 of extension  Continue to progress wrist weight bearing as tolerated  Plan: Continue per plan of care        Precautions: none       Manuals  2 2/15 2 3/                     Carpal mobs  GIII-IV GIII-IV GIII-IV  GIII-IV                                   Neuro Re-Ed                                                                                                        Ther Ex                          Putty  X 10            Putty pinch  X 10             Wrist ext/flex AAROm X 10 x 5" X 10 x 5"  MWM CC x 10 x 5" MWM CC 10 x 5"         wirst flex/ext  2 x 10 2lbs 3 x 10 3 lbs  3  X 15 3# 3 x 12  3 x 12 3#        Push up on 24"   3 x 12  3 x 10 3 x 10 18 in        [place] row    3 x 12 24# 3 x 12 26# 3 x 12 30#        Shoulder press    3 x12 15#  3 x12 20# 3 x 12 20#         Ther Activity                                       Gait Training                                       Modalities

## 2023-03-06 ENCOUNTER — OFFICE VISIT (OUTPATIENT)
Dept: PHYSICAL THERAPY | Facility: OTHER | Age: 23
End: 2023-03-06

## 2023-03-06 DIAGNOSIS — S52.501D CLOSED TRAUMATIC NONDISPLACED FRACTURE OF DISTAL END OF RIGHT RADIUS WITH ROUTINE HEALING, SUBSEQUENT ENCOUNTER: Primary | ICD-10-CM

## 2023-03-06 NOTE — PROGRESS NOTES
Daily Note     Today's date: 3/6/2023  Patient name: Isra Regan  : 2000  MRN: [id number]  Referring provider: Namita Melara*  Dx:   Encounter Diagnosis     ICD-10-CM    1  Closed traumatic nondisplaced fracture of distal end of right radius with routine healing, subsequent encounter  S52 501D           Start Time: 828  Stop Time: 1520  Total time in clinic (min): 35 minutes    Subjective:  Patient reports his wrist is feeling good  Objective: See treatment diary below      Assessment: Tolerated treatment well  Patient demonstrated fatigue post treatment  Patient with only mild wrist soreness was able to tolerate 18 inches without complaint  Patient wrist ROM is progressing 85 degrees of flexion and 80- of extension painfree   Patient was able to complete all push ups on level surface with no pain  Patient was able to get into front rack position but had some wrist discomfort when performing front squats, patient to hold on this position during strength training for a another week  Plan: Continue per plan of care        Precautions: none       Manuals  2/15 2/20 3/1 3/6                    Carpal mobs  GIII-IV GIII-IV GIII-IV  GIII-IV  GIII-IV                                  Neuro Re-Ed                                                                                                        Ther Ex                          Putty  X 10            Putty pinch  X 10             Wrist ext/flex AAROm X 10 x 5" X 10 x 5"  MWM CC x 10 x 5" MWM CC 10 x 5"         wirst flex/ext  2 x 10 2lbs 3 x 10 3 lbs  3  X 15 3# 3 x 12  3 x 12 3# 3 x 14 5#       Push up on 24"   3 x 12  3 x 10 3 x 10 18 in 3 x 12  Level ground       Ye row    3 x 12 24# 3 x 12 26# 3 x 12 30# 3 x 12        Shoulder press    3 x12 15#  3 x12 20# 3 x 12 20#  3 x 12 20#       Ther Activity                                       Gait Training                                       Modalities

## 2023-03-08 ENCOUNTER — APPOINTMENT (OUTPATIENT)
Dept: PHYSICAL THERAPY | Facility: OTHER | Age: 23
End: 2023-03-08

## 2023-03-13 ENCOUNTER — OFFICE VISIT (OUTPATIENT)
Dept: PHYSICAL THERAPY | Facility: OTHER | Age: 23
End: 2023-03-13

## 2023-03-13 DIAGNOSIS — S52.501D CLOSED TRAUMATIC NONDISPLACED FRACTURE OF DISTAL END OF RIGHT RADIUS WITH ROUTINE HEALING, SUBSEQUENT ENCOUNTER: Primary | ICD-10-CM

## 2023-03-13 NOTE — PROGRESS NOTES
Daily Note     Today's date: 3/13/2023  Patient name: Thomas Jones  : 2000  MRN: [id number]  Referring provider: Namita Melara*  Dx:   Encounter Diagnosis     ICD-10-CM    1  Closed traumatic nondisplaced fracture of distal end of right radius with routine healing, subsequent encounter  S52 501D           Start Time: 1400  Stop Time: 3022  Total time in clinic (min): 45 minutes    Subjective:  Patient reports improvements in wrist pain  Patient reports he is still having hamstring pain and was wondering if the PT could evaluate this hamstring  Objective: See treatment diary below  Prone knee flexion with break test L 60lbs R 49lbs (mild pain)  Sitting knee flexion isometric test L 67lbs R 44lbs  The patient presents with mild hamstring flexibility loss during 90/90 with 25 degrees on R and 20 degrees on L  Assessment: Tolerated treatment well  Patient demonstrated fatigue post treatment  Patient presents with signs and symptoms of a recovering grade II muscle strain  with ongoing strength and power deficits  Patient has been working with the AT but patient would benefit from continue hamstring strength and power development  Patient wrist ROM is now WNL and tolerance to weighbearing is now improved during both push ups and front rack position  Plan: Continue per plan of care        Precautions: none       Manuals  2/15 2/20 3/1 3/6 3/13                   Carpal mobs  GIII-IV GIII-IV GIII-IV  GIII-IV  GIII-IV                                  Neuro Re-Ed                                                                                                        Ther Ex                          Putty  X 10            Putty pinch  X 10             Wrist ext/flex AAROm X 10 x 5" X 10 x 5"  MWM CC x 10 x 5" MWM CC 10 x 5"         wirst flex/ext  2 x 10 2lbs 3 x 10 3 lbs  3  X 15 3# 3 x 12  3 x 12 3# 3 x 14 5# 3 x 12 5#      Push up on 24"   3 x 12  3 x 10 3 x 10 18 in 3 x 12  Level ground Level 3 x 15      Ye row    3 x 12 24# 3 x 12 26# 3 x 12 30# 3 x 12        Hamstring currls PBall        Eccentric 2 x 12 reps       RDLs with OB       3 x 8 45#                    Shoulder press    3 x12 15#  3 x12 20# 3 x 12 20#  3 x 12 20# 3 x 15 25#      Ther Activity                                       Gait Training                                       Modalities

## 2023-03-20 ENCOUNTER — OFFICE VISIT (OUTPATIENT)
Dept: PHYSICAL THERAPY | Facility: OTHER | Age: 23
End: 2023-03-20

## 2023-03-20 DIAGNOSIS — S52.501D CLOSED TRAUMATIC NONDISPLACED FRACTURE OF DISTAL END OF RIGHT RADIUS WITH ROUTINE HEALING, SUBSEQUENT ENCOUNTER: Primary | ICD-10-CM

## 2023-03-20 NOTE — PROGRESS NOTES
Daily Note     Today's date: 3/20/2023  Patient name: Valerie Solano  : 2000  MRN: [id number]  Referring provider: Namita Melara*  Dx:   Encounter Diagnosis     ICD-10-CM    1  Closed traumatic nondisplaced fracture of distal end of right radius with routine healing, subsequent encounter  S52 501D                      Subjective: Patient reports  Daily Note     Today's date: 3/20/2023  Patient name: Valerie Solano  : 2000  MRN: [id number]  Referring provider: Namita Melara*  Dx:   Encounter Diagnosis     ICD-10-CM    1  Closed traumatic nondisplaced fracture of distal end of right radius with routine healing, subsequent encounter  S52 501D                      Subjective:  Patient reports he was able to front squat without wrist pain  Objective: See treatment diary below  Wrist 85 deg of flexion 85 of extension pain free       Assessment: Tolerated treatment well  Patient demonstrated fatigue post treatment  Patient was able to clean 65 lbs with only minimal wrist pain which did not get worse with reps  Patient will return in two weeks for discharge  Plan: Continue per plan of care        Precautions: none       Manuals 2/6 2/8 2/15 2/20 3/1 3/6 3/13 3/20                  Carpal mobs  GIII-IV GIII-IV GIII-IV  GIII-IV  GIII-IV                                  Neuro Re-Ed                                                                                                        Ther Ex                          Putty  X 10            Putty pinch  X 10             Wrist ext/flex AAROm X 10 x 5" X 10 x 5"  MWM CC x 10 x 5" MWM CC 10 x 5"         wirst flex/ext  2 x 10 2lbs 3 x 10 3 lbs  3  X 15 3# 3 x 12  3 x 12 3# 3 x 14 5# 3 x 12 5# 3 x 15 5#     Push up on 24"   3 x 12  3 x 10 3 x 10 18 in 3 x 12  Level ground Level 3 x 15 leve 3 x 20     [place] row    3 x 12 24# 3 x 12 26# 3 x 12 30# 3 x 12        Hamstring currls PBall        Eccentric 2 x 12 reps       RDLs with OB       3 x 8 45#                    Shoulder press    3 x12 15#  3 x12 20# 3 x 12 20#  3 x 12 20# 3 x 15 25# 3 x 15 25#     Ther Activity             Clean from floor         85# 3 x 8                   Gait Training                                       Modalities

## 2023-04-03 ENCOUNTER — OFFICE VISIT (OUTPATIENT)
Dept: PHYSICAL THERAPY | Facility: OTHER | Age: 23
End: 2023-04-03

## 2023-04-03 DIAGNOSIS — G89.29 CHRONIC BILATERAL LOW BACK PAIN WITHOUT SCIATICA: Primary | ICD-10-CM

## 2023-04-03 DIAGNOSIS — M54.50 CHRONIC BILATERAL LOW BACK PAIN WITHOUT SCIATICA: Primary | ICD-10-CM

## 2023-04-03 NOTE — PROGRESS NOTES
Start Time: 1400  Stop Time: 3858  Total time in clinic (min): 45 minutes    Discharge    Today's date: 4/3/2023  Patient name: Seema Schofield  : 2000  MRN: [id number]  Referring provider: Namita Melara*  Dx:   Encounter Diagnosis     ICD-10-CM    1  Chronic bilateral low back pain without sciatica  M54 50     G89 29           Start Time: 1400  Stop Time: 1445  Total time in clinic (min): 45 minutes    Subjective:  Patient reports no issues with the wrist       Objective: See treatment diary below  Wrist 85 deg of flexion 85 of extension pain free       Assessment: Tolerated treatment well  Patient demonstrated fatigue post treatment  Patient with full function of his right wrist including small fall in wet grass last week with no issues  Patient is exercising without limitations  Plan: Continue per plan of care  Precautions: none       Goals  Increase wrist flexion to 80 degree 3 weeks met  Increase wrist extension to 70 degree 3 weeks met  Increase wrist flexion/extension strength by one mmt 4 weeks met  Increase  strength to 70 lbs 5 weeks met    Patient able to perform a push up pain free 8 weeks met  Patient able to 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

## 2023-04-03 NOTE — PROGRESS NOTES
PT Evaluation    Today's date: 4/3/2023  Patient name: Liz Siddiqui  : 2000  MRN: [id number]  Referring provider: Namita Melara*  Dx:   Encounter Diagnosis     ICD-10-CM    1  Chronic bilateral low back pain without sciatica  M54 50     G89 29           Start Time: 1400  Stop Time: 1445  Total time in clinic (min): 45 minutes    Assessment  Assessment details: Patient presents with acute exacerbation of chronic low back pain which has been made worse with running more lately during spring track  Patient with limitations in ROM ext>flexion, positive prone instability test, increased muscular response to low load movements included poor LM activation during prone hip extension  Patient current impairments limit his ability to perform to complete in his track meets and to exercise  Patient would benefit from  Patient would benefit from PT for lumbar motor control training and to assist in returning to full competition with less pain  Patient also with history of hamstring straining R>L starting at the beginning of the indoor season  Patients demonstrates a mild assymetry in hamstirng strength however pain is diminishing  Impairments: pain with function  Understanding of Dx/Px/POC: good  Goals  Patient will have full lumbar extension ROM pain free 4 weeks   Patient able to compete in 3 track events at a meet with no substantial increase in low back pain 4 weeks  Patient able to complete self management program independently 4 weeks         Subjective Evaluation    History of Present Illness  Date of onset: 12/3/2022  Mechanism of injury: Patient reports long standing low back pain that is been getting worse lately with increased running volume with outdoor track  He also reports bilateral R>L hamstring pain with running which has been persistent since he had his hamstring strain last Fall  He reports his back is worse after higher intensity practices and meets    He notes he gets some relief when he has long periods when he is not running a lot  He reports his back is no impacting his performance  Quality of life: good    Pain  Current pain ratin  At best pain ratin    Patient Goals  Patient goals for therapy: increased strength and return to sport/leisure activities  Patient goal: Patient would like to be able to compete without severe low back pain  Objective     Active Range of Motion     Lumbar   Flexion:  Restriction level: minimal  Extension:  with pain Restriction level: minimal  Mechanical Assessment    Cervical      Thoracic    Lying extension:   Pain location: no change  Pain level: increased    Lumbar    Sitting flexion:   Pain location: no change    Strength/Myotome Testing     Left Knee   Left knee flexion strength: 70 lbs     Right Knee   Right knee flexion strength: 65 lbs     Additional Strength Details  Myotomes normal       hypertoncity noted in erectors    Increase erector activity with hip motions in prone     Poor multifidus function on right      Tests     Lumbar     Left   Negative slump test      Right   Negative slump test      Additional Tests Details  Prone instability test positive              Precautions: none       Manuals 4/3                                                                Neuro Re-Ed                          Quad Flexion pattern  X 60 each side                                                                             Ther Ex                                                                                                                     Ther Activity                                       Gait Training                                       Modalities

## 2023-04-10 ENCOUNTER — APPOINTMENT (OUTPATIENT)
Dept: PHYSICAL THERAPY | Facility: OTHER | Age: 23
End: 2023-04-10

## 2023-04-24 ENCOUNTER — OFFICE VISIT (OUTPATIENT)
Dept: PHYSICAL THERAPY | Facility: OTHER | Age: 23
End: 2023-04-24

## 2023-04-24 DIAGNOSIS — M54.50 CHRONIC BILATERAL LOW BACK PAIN WITHOUT SCIATICA: Primary | ICD-10-CM

## 2023-04-24 DIAGNOSIS — G89.29 CHRONIC BILATERAL LOW BACK PAIN WITHOUT SCIATICA: Primary | ICD-10-CM

## 2023-04-24 NOTE — PROGRESS NOTES
Daily Note     Today's date: 2023  Patient name: Nir Kim  : 2000  MRN: [id number]  Referring provider: Namita Melara*  Dx:   Encounter Diagnosis     ICD-10-CM    1  Chronic bilateral low back pain without sciatica  M54 50     G89 29           Start Time: 1400  Stop Time: 1445  Total time in clinic (min): 45 minutes    Subjective: Patient reports he was sick last week and his back pain was about the same despite running well  over the weekend      Objective: See treatment diary below      Assessment: Tolerated treatment well  Patient tolerated treatment well today with no increase in symptoms throughout session  Patient with improved multifidus activation during lift test   Continue to evaluate symptoms during max sprinting efforts  Plan: Continue per plan of care        Precautions: none       Manuals 4/3 4/17 4/24                                                              Neuro Re-Ed                          Quad Flexion pattern  X 60 each side  CLX bland 3 x 10 3 x 30 CLX black                                    Prone alt UE and LE   3 x 12 X 12                                    Ther Ex                          Prone lumbar extension   Short arc 2 x 12  3 x 12 short arc          KB swing single arm   3 x 10 3 x 12 35lbs                                                                           Ther Activity                                       Gait Training                                       Modalities             NMES  LM 10 on 20 off 400pps  50hz 10 mins LM 10 on 20 off 400 pps 50hz 10 mins

## 2023-05-01 ENCOUNTER — OFFICE VISIT (OUTPATIENT)
Dept: PHYSICAL THERAPY | Facility: OTHER | Age: 23
End: 2023-05-01

## 2023-05-01 DIAGNOSIS — M54.50 CHRONIC BILATERAL LOW BACK PAIN WITHOUT SCIATICA: Primary | ICD-10-CM

## 2023-05-01 DIAGNOSIS — G89.29 CHRONIC BILATERAL LOW BACK PAIN WITHOUT SCIATICA: Primary | ICD-10-CM

## 2023-05-01 NOTE — PROGRESS NOTES
Daily Note     Today's date: 2023  Patient name: Tanika Mckinley  : 2000  MRN: [id number]  Referring provider: Namita Melara*  Dx:   Encounter Diagnosis     ICD-10-CM    1  Chronic bilateral low back pain without sciatica  M54 50     G89 29           Start Time: 1400  Stop Time: 1445  Total time in clinic (min): 45 minutes    Subjective: Patient reports his back pain has been minimal after running two days in a row  He also reports his hamstring pain also resolved three weeks ago after starting Pt for his back  Objective: See treatment diary below      Assessment: Tolerated treatment well  Patient tolerated treatment well today with no increase in symptoms after treatment  Patients reports minimal symptoms after running all weekend at multiple events  Plan: Continue per plan of care        Precautions: none       Manuals 4/3 4/17 4/24 5/1                                                             Neuro Re-Ed                          Quad Flexion pattern  X 60 each side  CLX bland 3 x 10 3 x 30 CLX black 3  X 30 clx black                                   Prone alt UE and LE   3 x 12 X 12 X 12                                   Ther Ex                          Prone lumbar extension   Short arc 2 x 12  3 x 12 short arc 3 x 12 short arc         KB swing single arm   3 x 10 3 x 12 35lbs 3 x 12 35 lbs                                                                          Ther Activity                                       Gait Training                                       Modalities             NMES  LM 10 on 20 off 400pps  50hz 10 mins LM 10 on 20 off 400 pps 50hz 10 mins LM 10 on 20 off 400 pp 50hz 10- mins

## 2023-05-08 ENCOUNTER — OFFICE VISIT (OUTPATIENT)
Dept: PHYSICAL THERAPY | Facility: OTHER | Age: 23
End: 2023-05-08

## 2023-05-08 DIAGNOSIS — M54.50 CHRONIC BILATERAL LOW BACK PAIN WITHOUT SCIATICA: Primary | ICD-10-CM

## 2023-05-08 DIAGNOSIS — G89.29 CHRONIC BILATERAL LOW BACK PAIN WITHOUT SCIATICA: Primary | ICD-10-CM

## 2023-05-08 NOTE — PROGRESS NOTES
Daily Note/Discharge     Today's date: 2023  Patient name: Marta Rojas  : 2000  MRN: [id number]  Referring provider: Namita Melara*  Dx:   Encounter Diagnosis     ICD-10-CM    1  Chronic bilateral low back pain without sciatica  M54 50     G89 29           Start Time: 1320  Stop Time: 1355  Total time in clinic (min): 35 minutes    Subjective: Patient reports improvements in symptoms with only mild low back soreness after running a personal RI  Objective: See treatment diary below      Assessment: Patient with near resolution of symptoms after running a RI in this track race this weekend  Patient was discharged to a [facility] today  Patient Goals  Patient goals for therapy: increased strength and return to sport/leisure activities resolved   Patient goal: Patient would like to be able to compete without severe low back pain  Resolved         Plan: Continue per plan of care        Precautions: none       Manuals 4/3 4/17 4/24 5/1 5/8                                                            Neuro Re-Ed                          Quad Flexion pattern  X 60 each side  CLX bland 3 x 10 3 x 30 CLX black 3  X 30 clx black                                   Prone alt UE and LE   3 x 12 X 12 X 12 X 12                                   Ther Ex                          Prone lumbar extension   Short arc 2 x 12  3 x 12 short arc 3 x 12 short arc 3 x 12 short arc         KB swing single arm   3 x 10 3 x 12 35lbs 3 x 12 35 lbs                                                                          Ther Activity                                       Gait Training                                       Modalities             NMES  LM 10 on 20 off 400pps  50hz 10 mins LM 10 on 20 off 400 pps 50hz 10 mins LM 10 on 20 off 400 pp 50hz 10- mins

## 2023-06-04 DIAGNOSIS — M54.50 CHRONIC LOW BACK PAIN WITHOUT SCIATICA, UNSPECIFIED BACK PAIN LATERALITY: Primary | ICD-10-CM

## 2023-06-04 DIAGNOSIS — G89.29 CHRONIC LOW BACK PAIN WITHOUT SCIATICA, UNSPECIFIED BACK PAIN LATERALITY: Primary | ICD-10-CM

## 2023-06-05 DIAGNOSIS — G89.29 CHRONIC LOW BACK PAIN WITHOUT SCIATICA, UNSPECIFIED BACK PAIN LATERALITY: Primary | ICD-10-CM

## 2023-06-05 DIAGNOSIS — M54.50 CHRONIC LOW BACK PAIN WITHOUT SCIATICA, UNSPECIFIED BACK PAIN LATERALITY: Primary | ICD-10-CM

## 2023-06-06 DIAGNOSIS — M54.50 CHRONIC LOW BACK PAIN WITHOUT SCIATICA, UNSPECIFIED BACK PAIN LATERALITY: Primary | ICD-10-CM

## 2023-06-06 DIAGNOSIS — G89.29 CHRONIC LOW BACK PAIN WITHOUT SCIATICA, UNSPECIFIED BACK PAIN LATERALITY: Primary | ICD-10-CM

## 2023-10-17 ENCOUNTER — ATHLETIC TRAINING (OUTPATIENT)
Dept: SPORTS MEDICINE | Facility: OTHER | Age: 23
End: 2023-10-17

## 2023-10-17 DIAGNOSIS — G89.29 CHRONIC BILATERAL LOW BACK PAIN WITHOUT SCIATICA: Primary | ICD-10-CM

## 2023-10-17 DIAGNOSIS — M54.50 CHRONIC BILATERAL LOW BACK PAIN WITHOUT SCIATICA: Primary | ICD-10-CM

## 2023-10-17 NOTE — PROGRESS NOTES
[facility]. Patient states that his back is still sore and is reporting for practice in one hour. Patient completed assigned rehab as documented on [facility] [place] [facility] rehab sheet. Patient completed 10 min. Warm bath. Patient has ankle taped for practice.

## 2023-10-24 DIAGNOSIS — M54.50 CHRONIC BILATERAL LOW BACK PAIN WITHOUT SCIATICA: Primary | ICD-10-CM

## 2023-10-24 DIAGNOSIS — G89.29 CHRONIC BILATERAL LOW BACK PAIN WITHOUT SCIATICA: Primary | ICD-10-CM

## 2024-01-16 ENCOUNTER — TELEPHONE (OUTPATIENT)
Age: 24
End: 2024-01-16

## 2024-01-16 NOTE — TELEPHONE ENCOUNTER
Anand with [facility] saw pt and feels he needs to be seen today. (I scheduled already) He normally sees Dr. Duarte, who is seeing him Friday. But she thinks he should be seen sooner. Zaida will fax note to our centrally located fax. If there is a direct office fax, please provide it to her. I scheduled patient with Dr. Duong deleon at 6:30pm.    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 Ambulance came, took vitals/sugars/EKG, all point of service, and patient doesn't remember the results of these tests. Pt was not referred to the ER. He was lightheaded with workouts and now he is lightheaded without workouts. He was experiencing cough/congestion last week and he's also been waking up with headaches.    Zaida FERRELL can provide more clinical info if needed at [phone]    And pt can be reached at [phone]

## 2024-01-17 NOTE — TELEPHONE ENCOUNTER
Zaida called back to check on status of patient. Telephone notes were relayed to her, she will call and speak with patient to see how he is feeling today.

## 2024-01-18 DIAGNOSIS — R55 SYNCOPE AND COLLAPSE: Primary | ICD-10-CM

## 2024-01-18 DIAGNOSIS — J02.9 SORE THROAT: ICD-10-CM

## 2024-01-24 ENCOUNTER — HOSPITAL ENCOUNTER (OUTPATIENT)
Dept: NON INVASIVE DIAGNOSTICS | Facility: CLINIC | Age: 24
Discharge: HOME | End: 2024-01-24
Payer: COMMERCIAL

## 2024-01-24 VITALS
HEART RATE: 80 BPM | DIASTOLIC BLOOD PRESSURE: 70 MMHG | WEIGHT: 148 LBS | SYSTOLIC BLOOD PRESSURE: 142 MMHG | HEIGHT: 70 IN | BODY MASS INDEX: 21.19 KG/M2

## 2024-01-24 DIAGNOSIS — R55 SYNCOPE AND COLLAPSE: ICD-10-CM

## 2024-01-24 LAB
ALBUMIN SERPL-MCNC: 4.7 G/DL (ref 3.6–5.1)
ALBUMIN/GLOB SERPL: 1.7 (CALC) (ref 1–2.5)
ALP SERPL-CCNC: 98 U/L (ref 36–130)
ALT SERPL-CCNC: 21 U/L (ref 9–46)
AST SERPL-CCNC: 24 U/L (ref 10–40)
BASOPHILS # BLD AUTO: 132 CELLS/UL (ref 0–200)
BASOPHILS NFR BLD AUTO: 2 %
BILIRUB SERPL-MCNC: 0.7 MG/DL (ref 0.2–1.2)
BUN SERPL-MCNC: 8 MG/DL (ref 7–25)
BUN/CREAT SERPL: NORMAL (CALC) (ref 6–22)
CALCIUM SERPL-MCNC: 9.5 MG/DL (ref 8.6–10.3)
CHLORIDE SERPL-SCNC: 102 MMOL/L (ref 98–110)
CO2 SERPL-SCNC: 26 MMOL/L (ref 20–32)
CREAT SERPL-MCNC: 0.92 MG/DL (ref 0.6–1.24)
EOSINOPHIL # BLD AUTO: 132 CELLS/UL (ref 15–500)
EOSINOPHIL NFR BLD AUTO: 2 %
ERYTHROCYTE [DISTWIDTH] IN BLOOD BY AUTOMATED COUNT: 11.8 % (ref 11–15)
GFR/BSA.PRED SERPLBLD CYS-BASED-ARV: 121 ML/MIN/1.73M2
GLOBULIN SER CALC-MCNC: 2.7 G/DL (CALC) (ref 1.9–3.7)
GLUCOSE SERPL-MCNC: 95 MG/DL (ref 65–99)
HCT VFR BLD AUTO: 43.4 % (ref 38.5–50)
HETEROPH AB SER QL LA: NEGATIVE
HGB BLD-MCNC: 15 G/DL (ref 13.2–17.1)
LYMPHOCYTES # BLD MANUAL: 1188 CELLS/UL (ref 850–3900)
LYMPHOCYTES NFR BLD AUTO: 18 %
MCH RBC QN AUTO: 29.9 PG (ref 27–33)
MCHC RBC AUTO-ENTMCNC: 34.6 G/DL (ref 32–36)
MCV RBC AUTO: 86.5 FL (ref 80–100)
MONOCYTES # BLD AUTO: 594 CELLS/UL (ref 200–950)
MONOCYTES NFR BLD AUTO: 9 %
NEUTROPHILS # BLD AUTO: 2706 CELLS/UL (ref 1500–7800)
NEUTROPHILS NFR BLD AUTO: 41 %
NEUTS BAND # BLD: 198 CELLS/UL (ref 0–750)
NEUTS BAND NFR BLD MANUAL: 3 %
PLATELET # BLD AUTO: 176 THOUSAND/UL (ref 140–400)
PMV BLD REES-ECKER: 11 FL (ref 7.5–12.5)
POTASSIUM SERPL-SCNC: 4.1 MMOL/L (ref 3.5–5.3)
PROT SERPL-MCNC: 7.4 G/DL (ref 6.1–8.1)
RBC # BLD AUTO: 5.02 MILLION/UL (ref 4.2–5.8)
SODIUM SERPL-SCNC: 140 MMOL/L (ref 135–146)
TSH SERPL-ACNC: 2.08 MIU/L (ref 0.4–4.5)
VARIANT LYMPHS NFR BLD: 1650 CELLS/UL
VARIANT LYMPHS NFR BLD: 25 % (ref 0–10)
WBC # BLD AUTO: 6.6 THOUSAND/UL (ref 3.8–10.8)

## 2024-01-24 PROCEDURE — 93306 TTE W/DOPPLER COMPLETE: CPT

## 2024-01-24 PROCEDURE — 93306 TTE W/DOPPLER COMPLETE: CPT | Performed by: INTERNAL MEDICINE

## 2024-01-25 LAB
AORTIC ROOT: 3.1 CM
ASCENDING AORTA: 3 CM
BSA FOR ECHO PROCEDURE: 1.84 M2
E WAVE DECELERATION TIME: 221 MS
E/A RATIO: 2.61
FRACTIONAL SHORTENING: 28 (ref 28–44)
INTERVENTRICULAR SEPTUM IN DIASTOLE (PARASTERNAL SHORT AXIS VIEW): 0.9 CM
INTERVENTRICULAR SEPTUM: 0.9 CM (ref 0.6–1.1)
LAAS-AP2: 18.7 CM2
LAAS-AP4: 15 CM2
LEFT ATRIUM SIZE: 2.9 CM
LEFT ATRIUM VOLUME (MOD BIPLANE): 48 ML
LEFT ATRIUM VOLUME INDEX (MOD BIPLANE): 26.1 ML/M2
LEFT INTERNAL DIMENSION IN SYSTOLE: 3.3 CM (ref 2.1–4)
LEFT VENTRICULAR INTERNAL DIMENSION IN DIASTOLE: 4.6 CM (ref 3.5–6)
LEFT VENTRICULAR POSTERIOR WALL IN END DIASTOLE: 1 CM
LEFT VENTRICULAR STROKE VOLUME: 56 ML
LVSV (TEICH): 56 ML
MV E'TISSUE VEL-LAT: 22 CM/S
MV E'TISSUE VEL-SEP: 18 CM/S
MV PEAK A VEL: 0.36 M/S
MV PEAK E VEL: 94 CM/S
MV STENOSIS PRESSURE HALF TIME: 64 MS
MV VALVE AREA P 1/2 METHOD: 3.44
RA PRESSURE ESTIMATED: 3 MMHG
RIGHT ATRIUM AREA SYSTOLE A4C: 15.2 CM2
RIGHT VENTRICLE ID DIMENSION: 3.5 CM
RV PSP: 28 MMHG
SL CV LEFT ATRIUM LENGTH A2C: 4.8 CM
SL CV LV EF: 60
SL CV PED ECHO LEFT VENTRICLE DIASTOLIC VOLUME (MOD BIPLANE) 2D: 100 ML
SL CV PED ECHO LEFT VENTRICLE SYSTOLIC VOLUME (MOD BIPLANE) 2D: 44 ML
TR MAX PG: 25 MMHG
TR PEAK VELOCITY: 2.5 M/S
TRICUSPID ANNULAR PLANE SYSTOLIC EXCURSION: 2.9 CM
TRICUSPID VALVE PEAK REGURGITATION VELOCITY: 2.81 M/S

## 2024-01-31 ENCOUNTER — OFFICE VISIT (OUTPATIENT)
Dept: FAMILY MEDICINE CLINIC | Facility: CLINIC | Age: 24
End: 2024-01-31
Payer: COMMERCIAL

## 2024-01-31 VITALS
OXYGEN SATURATION: 96 % | TEMPERATURE: 97.1 F | SYSTOLIC BLOOD PRESSURE: 138 MMHG | HEART RATE: 96 BPM | DIASTOLIC BLOOD PRESSURE: 96 MMHG | WEIGHT: 141 LBS | HEIGHT: 70 IN | BODY MASS INDEX: 20.19 KG/M2

## 2024-01-31 DIAGNOSIS — R55 NEAR SYNCOPE: ICD-10-CM

## 2024-01-31 DIAGNOSIS — R61 NIGHT SWEATS: ICD-10-CM

## 2024-01-31 DIAGNOSIS — R55 SYNCOPE, UNSPECIFIED SYNCOPE TYPE: Primary | ICD-10-CM

## 2024-01-31 DIAGNOSIS — J02.9 SORE THROAT: ICD-10-CM

## 2024-01-31 LAB
S PYO AG THROAT QL: NEGATIVE
SARS-COV-2 AG UPPER RESP QL IA: NEGATIVE
SL AMB POCT RAPID FLU A: NORMAL
SL AMB POCT RAPID FLU B: NORMAL
VALID CONTROL: NORMAL

## 2024-01-31 PROCEDURE — 99214 OFFICE O/P EST MOD 30 MIN: CPT | Performed by: FAMILY MEDICINE

## 2024-01-31 PROCEDURE — 87804 INFLUENZA ASSAY W/OPTIC: CPT | Performed by: FAMILY MEDICINE

## 2024-01-31 PROCEDURE — 87880 STREP A ASSAY W/OPTIC: CPT | Performed by: FAMILY MEDICINE

## 2024-01-31 PROCEDURE — 87811 SARS-COV-2 COVID19 W/OPTIC: CPT | Performed by: FAMILY MEDICINE

## 2024-02-01 LAB
ANA SER QL IF: NEGATIVE
B BURGDOR AB SER IA-ACNC: <0.9 INDEX
BASOPHILS # BLD AUTO: 158 CELLS/UL (ref 0–200)
BASOPHILS NFR BLD AUTO: 1 %
CRP SERPL-MCNC: 20.5 MG/L
EBV NA IGG SER IA-ACNC: <18 U/ML
EBV VCA IGG SER IA-ACNC: 39.7 U/ML
EBV VCA IGM SER IA-ACNC: >160 U/ML
EOSINOPHIL # BLD AUTO: 0 CELLS/UL (ref 15–500)
EOSINOPHIL NFR BLD AUTO: 0 %
ERYTHROCYTE [DISTWIDTH] IN BLOOD BY AUTOMATED COUNT: 11.8 % (ref 11–15)
ERYTHROCYTE [SEDIMENTATION RATE] IN BLOOD BY WESTERGREN METHOD: 6 MM/H
HCT VFR BLD AUTO: 45.2 % (ref 38.5–50)
HGB BLD-MCNC: 15.5 G/DL (ref 13.2–17.1)
LYMPHOCYTES # BLD MANUAL: 2528 CELLS/UL (ref 850–3900)
LYMPHOCYTES NFR BLD AUTO: 16 %
MCH RBC QN AUTO: 30 PG (ref 27–33)
MCHC RBC AUTO-ENTMCNC: 34.3 G/DL (ref 32–36)
MCV RBC AUTO: 87.6 FL (ref 80–100)
MONOCYTES # BLD AUTO: 948 CELLS/UL (ref 200–950)
MONOCYTES NFR BLD AUTO: 6 %
NEUTROPHILS # BLD AUTO: 5530 CELLS/UL (ref 1500–7800)
NEUTROPHILS NFR BLD AUTO: 35 %
PLATELET # BLD AUTO: 210 THOUSAND/UL (ref 140–400)
PMV BLD REES-ECKER: 10.8 FL (ref 7.5–12.5)
RBC # BLD AUTO: 5.16 MILLION/UL (ref 4.2–5.8)
SL AMB INTERPRETATION: ABNORMAL
VARIANT LYMPHS NFR BLD: 42 % (ref 0–10)
VARIANT LYMPHS NFR BLD: 6636 CELLS/UL
WBC # BLD AUTO: 15.8 THOUSAND/UL (ref 3.8–10.8)

## 2024-02-06 ENCOUNTER — OFFICE VISIT (OUTPATIENT)
Dept: CARDIOLOGY CLINIC | Facility: CLINIC | Age: 24
End: 2024-02-06
Payer: COMMERCIAL

## 2024-02-06 VITALS
HEART RATE: 64 BPM | BODY MASS INDEX: 20.04 KG/M2 | HEIGHT: 70 IN | SYSTOLIC BLOOD PRESSURE: 102 MMHG | DIASTOLIC BLOOD PRESSURE: 68 MMHG | WEIGHT: 140 LBS

## 2024-02-06 DIAGNOSIS — R55 SYNCOPE, UNSPECIFIED SYNCOPE TYPE: Primary | ICD-10-CM

## 2024-02-06 DIAGNOSIS — R55 SYNCOPE AND COLLAPSE: ICD-10-CM

## 2024-02-06 PROCEDURE — 99204 OFFICE O/P NEW MOD 45 MIN: CPT | Performed by: INTERNAL MEDICINE

## 2024-02-06 PROCEDURE — 93000 ELECTROCARDIOGRAM COMPLETE: CPT | Performed by: INTERNAL MEDICINE

## 2024-02-06 NOTE — PATIENT INSTRUCTIONS
Drink plenty of water with electrolytes or Gatorade    Avoid alcohol and caffeine as much as possible.

## 2024-02-06 NOTE — PROGRESS NOTES
EPS Consultation/New Patient Evaluation - Oswald Haynes 23 y.o. male MRN: [id number]       Referring:Aydin Duarte DO     CC/HPI:   It was a pleasure to see Oswald Haynes in our arrhythmia clinic at [facility]. As you know he is a 23 y.o. male with no significant medical history who presents to discuss management of syncopal episode.    Patient had syncopal episode on January 12, 2024.  He had finished his track workout followed by weight lifting.  [place] the [place] and felt lightheadedness.  He sat down and felt better but then he stood up and passed out.  He woke up on the floor without any symptoms.  Prior to the syncopal episode he has had episodes of lightheadedness especially after doing workout.  He once on track for about 1-1 and half hours followed by doing weight lifting for 30 minutes.  He drinks water, Gatorade and green tea but admits he is not hydrating as he should.  He also on weekend has beers and mixed drinks but denies drinking daily.  He denies any drug use.  He presents with his father who also provides further history.  His father reports no family history of sudden death.  His father has chronic fatigue syndrome and also has headaches.  Patient and his sister also have headaches.  Oswald occasionally takes Tylenol for pain and headache.  But otherwise denies any over-the-counter medications.    Additionally after his syncopal episode he was diagnosed with mononucleosis.  He had sore throat, fatigue and bodyaches that started after the syncopal episode.  He has not worked out since then.    Past Medical History:  No past medical history on file.    Medications:    No current outpatient medications on file.     Family History   Problem Relation Age of Onset    Seizures Mother         Epilepsia    Hyperlipidemia Maternal Grandmother     Stroke Maternal Grandmother     Diabetes Maternal Grandfather     Stroke Maternal Grandfather     Colon cancer Paternal  "Grandfather      Social History     Socioeconomic History    Marital status: Single     Spouse name: Not on file    Number of children: Not on file    Years of education: Not on file    Highest education level: Not on file   Occupational History    Not on file   Tobacco Use    Smoking status: Never    Smokeless tobacco: Never    Tobacco comments:     smoke free home   Vaping Use    Vaping status: Never Used   Substance and Sexual Activity    Alcohol use: No    Drug use: No    Sexual activity: Not on file   Other Topics Concern    Not on file   Social History Narrative    Not on file     Social Determinants of Health     Financial Resource Strain: Not on file   Food Insecurity: Not on file   Transportation Needs: Not on file   Physical Activity: Not on file   Stress: Not on file   Social Connections: Not on file   Intimate Partner Violence: Not on file   Housing Stability: Not on file     Social History     Tobacco Use   Smoking Status Never   Smokeless Tobacco Never   Tobacco Comments    smoke free home     Social History     Substance and Sexual Activity   Alcohol Use No       Review of Systems   Constitutional: Negative for chills and fever.   HENT: Negative.     Eyes:  Negative for blurred vision and double vision.   Cardiovascular:  Negative for chest pain, dyspnea on exertion, leg swelling, near-syncope, orthopnea, palpitations, paroxysmal nocturnal dyspnea and syncope.   Respiratory:  Negative for cough and sputum production.    Endocrine: Negative.    Skin: Negative.  Negative for rash.   Musculoskeletal: Negative.  Negative for arthritis and joint pain.   Gastrointestinal:  Negative for abdominal pain, nausea and vomiting.   Genitourinary: Negative.    Neurological: Negative.  Negative for dizziness and light-headedness.   Psychiatric/Behavioral: Negative.  The patient is not nervous/anxious.         Objective:     Vitals: Blood pressure 102/68, pulse 64, height 5' 10\" (1.778 m), weight 63.5 kg (140 lb)., " Body mass index is 20.09 kg/m².,      Vital signs supine: 126/78 mmHg, heart rate 66 bpm  Sittin/76 mmHg, heart rate of 96 bpm  Standin/72 mmHg, heart rate of 103 bpm    Physical Exam:    GEN: Oswald Haynes appears well, alert and oriented x 3, pleasant and cooperative   HEENT: pupils equal, round, and reactive to light; extraocular muscles intact  NECK: supple, no carotid bruits   HEART: regular rhythm, normal S1 and S2, no murmurs, clicks, gallops or rubs   LUNGS: clear to auscultation bilaterally; no wheezes, rales, or rhonchi   ABDOMEN: normal bowel sounds, soft, no tenderness, no distention  EXTREMITIES: peripheral pulses normal; no clubbing, cyanosis, or edema  NEURO: no focal findings   SKIN: normal without suspicious lesions on exposed skin      Labs & Results:  Below is the patient's most recent value for Albumin, ALT, AST, BUN, Calcium, Chloride, Cholesterol, CO2, Creatinine, GFR, Glucose, HDL, Hematocrit, Hemoglobin, Hemoglobin A1C, LDL, Magnesium, Phosphorus, Platelets, Potassium, PSA, Sodium, Triglycerides, and WBC.   Lab Results   Component Value Date    ALT 21 2024    AST 24 2024    BUN 8 2024    CALCIUM 9.5 2024     2024    CO2 26 2024    CREATININE 0.92 2024    HCT 45.2 2024    HGB 15.5 2024     2024    K 4.1 2024     2017    WBC 15.8 (H) 2024     Note: for a comprehensive list of the patient's lab results, access the Results Review activity.          Cardiac testing:     I personally reviewed the ECG performed in the clinic on 24. It reveals normal sinus rhythm at 64 bpm.    Echocardiograms:  No results found for this or any previous visit.    No results found for this or any previous visit.      Catheterizations:   No results found for this or any previous visit.      Stress Tests:  No results found for this or any previous visit.      Holter monitor -   No results found for this or  any previous visit.    No results found for this or any previous visit.        ASSESSMENT/PLAN:  Syncope  Patient had syncopal episode in January 2024  Occurred after his usual track workout followed by exercising with weight lifting  According to history appears patient had orthostatic hypotension leading to syncopal episode  Also has been diagnosed with mononucleosis afterwards which likely contributed to syncopal episode  Orthostatics were positive in the office today  EKG and echocardiogram shows normal rhythm and structurally normal heart  With no symptoms during track workout, doubt there is any evidence to support coronary artery disease  Therefore likely cause of his syncopal episode is orthostatic hypotension  Recommended patient avoid caffeine, alcohol and drink water with electrolytes as well as Gatorade  Record blood pressure readings in the morning and in the evening and a log and bring it to the next office visit  Advised patient to sit down or lay down if he feels lightheadedness and take his time standing up  Will follow-up in 3 months

## 2024-02-10 PROBLEM — S52.501A: Status: RESOLVED | Noted: 2022-12-14 | Resolved: 2024-02-10

## 2024-02-10 NOTE — PROGRESS NOTES
Assessment/Plan: Treat as likely viral syndrome.  Cannot rule out mono.  Rapid strep and rapid COVID and rapid flu came back negative today.  Recommend lab work as ordered.  Has not [employer] athlete must clear him from a cardiac standpoint in my opinion.  Referral made to cardiology.  Doubt this is cardiac.  Still think viral issue.  No track until further notice.  Has not been around anyone else sick.  Continue to follow.    No problem-specific Assessment & Plan notes found for this encounter.       Diagnoses and all orders for this visit:    Sore throat  -     POCT rapid ANTIGEN strepA  -     POCT Rapid Covid Ag  -     POCT rapid flu A and B    Night sweats  -     GEETHA Screen w/ Reflex to Titer/Pattern; Future  -     CBC and differential; Future  -     Sedimentation rate, automated; Future  -     C-reactive protein; Future  -     Luis Barr Virus Antibody Panel; Future  -     Lyme Total AB W Reflex to IGM/IGG; Future        1. Sore throat  POCT rapid ANTIGEN strepA    POCT Rapid Covid Ag    POCT rapid flu A and B      2. Night sweats  GEETHA Screen w/ Reflex to Titer/Pattern    CBC and differential    Sedimentation rate, automated    C-reactive protein    Luis Barr Virus Antibody Panel    Lyme Total AB W Reflex to IGM/IGG          Subjective:        Patient ID: Oswald Haynes is a 23 y.o. male.  Chief Complaint   Patient presents with    Follow-up     Syncope, states he has been feeling lightheaded on and off        Sick for a number of days.  Fatigue.  Body aches.        The following portions of the patient's history were reviewed and updated as appropriate: past medical history, past surgical history and problem list.      Review of Systems   Constitutional:  Positive for chills, fatigue and fever.   Eyes:  Negative for visual disturbance.   Respiratory:  Negative for cough and shortness of breath.    Cardiovascular:  Negative for chest pain, palpitations and leg swelling.   Gastrointestinal:  Negative for  "abdominal pain.   Musculoskeletal:  Positive for myalgias.   Neurological:  Negative for dizziness and headaches.   Psychiatric/Behavioral:  The patient is not nervous/anxious.          Objective:  /96 (BP Location: Left arm, Patient Position: Sitting, Cuff Size: Adult)   Pulse 96   Temp (!) 97.1 °F (36.2 °C) (Temporal)   Ht 5' 10\" (1.778 m)   Wt 64 kg (141 lb)   SpO2 96%   BMI 20.23 kg/m²        Physical Exam  Vitals and nursing note reviewed.   Constitutional:       General: He is not in acute distress.     Appearance: Normal appearance. He is not ill-appearing, toxic-appearing or diaphoretic.   HENT:      Head: Normocephalic and atraumatic.      Right Ear: Tympanic membrane normal.      Left Ear: Tympanic membrane normal.   Eyes:      General: No scleral icterus.     Pupils: Pupils are equal, round, and reactive to light.   Cardiovascular:      Rate and Rhythm: Normal rate and regular rhythm.      Heart sounds: Normal heart sounds. No murmur heard.  Pulmonary:      Effort: No respiratory distress.      Breath sounds: Normal breath sounds.   Lymphadenopathy:      Cervical: No cervical adenopathy.   Skin:     Coloration: Skin is not pale.   Neurological:      Mental Status: He is alert and oriented to person, place, and time.   Psychiatric:         Behavior: Behavior normal.         Thought Content: Thought content normal.         "

## 2024-03-07 ENCOUNTER — ATHLETIC TRAINING (OUTPATIENT)
Dept: SPORTS MEDICINE | Facility: OTHER | Age: 24
End: 2024-03-07

## 2024-03-07 DIAGNOSIS — M54.50 CHRONIC BILATERAL LOW BACK PAIN WITHOUT SCIATICA: Primary | ICD-10-CM

## 2024-03-07 DIAGNOSIS — G89.29 CHRONIC BILATERAL LOW BACK PAIN WITHOUT SCIATICA: Primary | ICD-10-CM

## 2024-03-08 NOTE — PROGRESS NOTES
Athletic Training Daily Note    Name: Oswald Haynes  Age: 23 y.o.     Visit Diagnosis: low back pain    Subjective: Ath reports to [facility] for rehab.    Objective:     Treatment Log:     15 min venom   Cat/cow  TTN  Wall low back mobility drills  Cupping

## 2024-03-12 ENCOUNTER — ATHLETIC TRAINING (OUTPATIENT)
Dept: SPORTS MEDICINE | Facility: OTHER | Age: 24
End: 2024-03-12

## 2024-03-12 DIAGNOSIS — M79.10 MUSCLE SORENESS: Primary | ICD-10-CM

## 2024-03-12 NOTE — PROGRESS NOTES
Athletic Training Daily Note    Name: Oswald Haynes  Age: 23 y.o.     Visit Diagnosis: leg soreness    Subjective: Ath reports to [facility] for rehab.    Objective:     Treatment Log:     15 min venom   Cat/cow  TTN  LE stretching  Cupping upper back  IASTM b/l calf

## 2024-03-13 ENCOUNTER — ATHLETIC TRAINING (OUTPATIENT)
Dept: SPORTS MEDICINE | Facility: OTHER | Age: 24
End: 2024-03-13

## 2024-03-13 DIAGNOSIS — M62.81 MUSCLE WEAKNESS OF LOWER EXTREMITY: Primary | ICD-10-CM

## 2024-03-14 NOTE — PROGRESS NOTES
Athletic Training Daily Note    Name: Oswald Haynes  Age: 23 y.o.     Visit Diagnosis: right HS weakness    Subjective: Ath reports to [facility] for rehab.    Objective:     Treatment Log:     HS stretch  SL RDL  SL eccentric step down  HS curl  IASTM

## 2024-03-15 ENCOUNTER — ATHLETIC TRAINING (OUTPATIENT)
Dept: SPORTS MEDICINE | Facility: OTHER | Age: 24
End: 2024-03-15

## 2024-03-15 DIAGNOSIS — M62.81 MUSCLE WEAKNESS OF LOWER EXTREMITY: Primary | ICD-10-CM

## 2024-03-18 ENCOUNTER — ATHLETIC TRAINING (OUTPATIENT)
Dept: SPORTS MEDICINE | Facility: OTHER | Age: 24
End: 2024-03-18

## 2024-03-18 DIAGNOSIS — M62.81 MUSCLE WEAKNESS OF LOWER EXTREMITY: Primary | ICD-10-CM

## 2024-03-19 ENCOUNTER — ATHLETIC TRAINING (OUTPATIENT)
Dept: SPORTS MEDICINE | Facility: OTHER | Age: 24
End: 2024-03-19

## 2024-03-19 DIAGNOSIS — M54.50 CHRONIC BILATERAL LOW BACK PAIN WITHOUT SCIATICA: Primary | ICD-10-CM

## 2024-03-19 DIAGNOSIS — G89.29 CHRONIC BILATERAL LOW BACK PAIN WITHOUT SCIATICA: Primary | ICD-10-CM

## 2024-03-19 NOTE — PROGRESS NOTES
Athletic Training Daily Note    Name: Oswald Haynes  Age: 23 y.o.     Visit Diagnosis: back tightness    Subjective: Ath reports to [facility] for [facility].    Objective:     Treatment Log:     Venom  LE stretching  Cat/cow  TTN  Wall mobility  Cupping

## 2024-03-19 NOTE — PROGRESS NOTES
Athletic Training Daily Note    Name: Oswald Haynes  Age: 23 y.o.     Visit Diagnosis: right HS weakness    Subjective: Ath reports to [facility] for re[facility].    Objective:     Treatment Log:     HS stretch  Bridge w/ball squeeze  Prone HS curl  RDL  IASTM

## 2024-03-26 ENCOUNTER — ATHLETIC TRAINING (OUTPATIENT)
Dept: SPORTS MEDICINE | Facility: OTHER | Age: 24
End: 2024-03-26

## 2024-03-26 DIAGNOSIS — M62.81 MUSCLE WEAKNESS OF LOWER EXTREMITY: Primary | ICD-10-CM

## 2024-03-26 NOTE — PROGRESS NOTES
Athletic Training Daily Note    Name: Oswald Haynes  Age: 23 y.o.     Visit Diagnosis: muscle weakness    Subjective: Ath reports to [facility] for [facility].    Objective:     Treatment Log:     MHP  LE stretching  Cuff weight HS curl  Calf raise w/toes ext.  Forward foot split squat  [place] HS curl  IASTM

## 2024-03-27 ENCOUNTER — ATHLETIC TRAINING (OUTPATIENT)
Dept: SPORTS MEDICINE | Facility: OTHER | Age: 24
End: 2024-03-27

## 2024-03-27 DIAGNOSIS — G89.29 CHRONIC BILATERAL LOW BACK PAIN WITHOUT SCIATICA: Primary | ICD-10-CM

## 2024-03-27 DIAGNOSIS — M54.50 CHRONIC BILATERAL LOW BACK PAIN WITHOUT SCIATICA: Primary | ICD-10-CM

## 2024-03-28 NOTE — PROGRESS NOTES
Athletic Training Daily Note    Name: Oswald Haynes  Age: 23 y.o.     Visit Diagnosis: back pain    Subjective: Ath reports to [facility] for [facility].    Objective:     Treatment Log:     P  LE stretching  Cupping

## 2024-03-29 ENCOUNTER — ATHLETIC TRAINING (OUTPATIENT)
Dept: SPORTS MEDICINE | Facility: OTHER | Age: 24
End: 2024-03-29

## 2024-03-29 DIAGNOSIS — M79.10 MUSCLE SORENESS: Primary | ICD-10-CM

## 2024-03-29 NOTE — PROGRESS NOTES
Athletic Training Daily Note    Name: Oswald Haynes  Age: 23 y.o.     Visit Diagnosis: Muscle soreness [M79.10]    Subjective: Ath reports to [facility] for [facility].    Objective:     Treatment Log:     Calve and hamstring massage

## 2024-04-03 ENCOUNTER — ATHLETIC TRAINING (OUTPATIENT)
Dept: SPORTS MEDICINE | Facility: OTHER | Age: 24
End: 2024-04-03

## 2024-04-03 DIAGNOSIS — M62.81 MUSCLE WEAKNESS OF LOWER EXTREMITY: Primary | ICD-10-CM

## 2024-04-04 ENCOUNTER — ATHLETIC TRAINING (OUTPATIENT)
Dept: SPORTS MEDICINE | Facility: OTHER | Age: 24
End: 2024-04-04

## 2024-04-04 DIAGNOSIS — M79.10 MUSCLE SORENESS: Primary | ICD-10-CM

## 2024-04-04 NOTE — PROGRESS NOTES
Athletic Training Daily Note    Name: Oswald Haynes  Age: 23 y.o.     Visit Diagnosis: b/l hamstring weakness    Subjective: Ath reports to [facility] for [facility].    Objective:     Treatment Log:     LE stretching  SL RDLs  Calf raises  SL HS exercise ball curls  Post tib calf raise  Side plank clam shell  IASTM          contact guard

## 2024-04-04 NOTE — PROGRESS NOTES
Athletic Training Daily Note    Name: Oswald Haynes  Age: 23 y.o.     Visit Diagnosis: Muscle soreness [M79.10]    Subjective: Ath reports to [facility] for [facility]. Ath performed exercises per last session.     Objective:     Treatment Log:  Ath completed low back mobility  Cups w movement   Cat cows 30 reps  Thread the needles 20 reps  RDLs - 3x10   3-5x/week

## 2024-04-05 ENCOUNTER — ATHLETIC TRAINING (OUTPATIENT)
Dept: SPORTS MEDICINE | Facility: OTHER | Age: 24
End: 2024-04-05

## 2024-04-05 DIAGNOSIS — M79.10 MUSCLE SORENESS: Primary | ICD-10-CM

## 2024-04-07 NOTE — PROGRESS NOTES
Athletic Training Daily Note    Name: Oswald Haynes  Age: 23 y.o.     Visit Diagnosis: muscle soreness    Subjective: Ath reports to [facility] for re[facility].    Objective:     Treatment Log:     P  LE stretching  Massage

## 2024-04-10 ENCOUNTER — ATHLETIC TRAINING (OUTPATIENT)
Dept: SPORTS MEDICINE | Facility: OTHER | Age: 24
End: 2024-04-10

## 2024-04-10 DIAGNOSIS — M79.10 MUSCLE SORENESS: Primary | ICD-10-CM

## 2024-04-11 NOTE — PROGRESS NOTES
Athletic Training Daily Note    Name: Oswald Haynes  Age: 23 y.o.     Visit Diagnosis: muscle soreness    Subjective: Ath reports to [facility] for re[facility].    Objective:     Treatment Log:     MHP  LE stretching  SL RDL  Post tib calf raise  IASTM

## 2024-04-17 ENCOUNTER — ATHLETIC TRAINING (OUTPATIENT)
Dept: SPORTS MEDICINE | Facility: OTHER | Age: 24
End: 2024-04-17

## 2024-04-17 DIAGNOSIS — M79.10 MUSCLE SORENESS: Primary | ICD-10-CM

## 2024-04-18 NOTE — PROGRESS NOTES
Athletic Training Daily Note    Name: Oswald Haynes  Age: 23 y.o.     Visit Diagnosis: muscle soreness    Subjective: Ath reports to [facility] for [facility].    Objective:     Treatment Log:     MHP  LE stretching  Glute ext. W/TB  Post tib calf raise  IASTM

## 2024-04-25 ENCOUNTER — ATHLETIC TRAINING (OUTPATIENT)
Dept: SPORTS MEDICINE | Facility: OTHER | Age: 24
End: 2024-04-25

## 2024-04-25 DIAGNOSIS — G89.29 CHRONIC BILATERAL LOW BACK PAIN WITHOUT SCIATICA: Primary | ICD-10-CM

## 2024-04-25 DIAGNOSIS — M54.50 CHRONIC BILATERAL LOW BACK PAIN WITHOUT SCIATICA: Primary | ICD-10-CM

## 2024-04-25 NOTE — PROGRESS NOTES
Athletic Training Daily Note    Name: Oswald Haynes  Age: 23 y.o.     Visit Diagnosis: back tightness    Subjective: Ath reports to [facility] for [facility].    Objective:     Treatment Log:     P  LE stretching  Cupping

## 2024-07-15 ENCOUNTER — OFFICE VISIT (OUTPATIENT)
Dept: FAMILY MEDICINE CLINIC | Facility: CLINIC | Age: 24
End: 2024-07-15
Payer: COMMERCIAL

## 2024-07-15 VITALS
TEMPERATURE: 98.5 F | SYSTOLIC BLOOD PRESSURE: 130 MMHG | DIASTOLIC BLOOD PRESSURE: 80 MMHG | HEIGHT: 70 IN | OXYGEN SATURATION: 95 % | HEART RATE: 72 BPM | WEIGHT: 140 LBS | RESPIRATION RATE: 16 BRPM | BODY MASS INDEX: 20.04 KG/M2

## 2024-07-15 DIAGNOSIS — M62.838 MUSCLE SPASM: ICD-10-CM

## 2024-07-15 DIAGNOSIS — M54.6 ACUTE LEFT-SIDED THORACIC BACK PAIN: ICD-10-CM
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
## 2024-07-15 DIAGNOSIS — M54.50 ACUTE RIGHT-SIDED LOW BACK PAIN WITHOUT SCIATICA: ICD-10-CM

## 2024-07-15 PROCEDURE — 99214 OFFICE O/P EST MOD 30 MIN: CPT | Performed by: NURSE PRACTITIONER

## 2024-07-15 RX ORDER — METHOCARBAMOL 500 MG/1
500 TABLET, FILM COATED ORAL 2 TIMES DAILY PRN
Qty: 15 TABLET | Refills: 0 | Status: SHIPPED | OUTPATIENT
Start: 2024-07-15

## 2024-07-15 NOTE — PATIENT INSTRUCTIONS
Complete imaging.     Over the counter advil or tylenol as needed.     Robaxin twice a day as needed. This medication may cause drowsiness. Do not drive on this medication. Do not drink alcohol while taking this medication. Do not mix with other medications that may cause drowsiness.     PT if imaging shows no acute findings.     Please call the office if you are experiencing any worsening of symptoms or no symptom improvement.
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
## 2024-08-25 ENCOUNTER — APPOINTMENT (OUTPATIENT)
Dept: RADIOLOGY | Facility: MEDICAL CENTER | Age: 24
End: 2024-08-25
Payer: COMMERCIAL

## 2024-08-25 DIAGNOSIS — M54.6 ACUTE LEFT-SIDED THORACIC BACK PAIN: ICD-10-CM

## 2024-08-25 DIAGNOSIS — M54.50 ACUTE RIGHT-SIDED LOW BACK PAIN WITHOUT SCIATICA: ICD-10-CM

## 2024-08-25 PROCEDURE — 71101 X-RAY EXAM UNILAT RIBS/CHEST: CPT

## 2024-08-25 PROCEDURE — 72100 X-RAY EXAM L-S SPINE 2/3 VWS: CPT

## 2024-08-25 PROCEDURE — 72072 X-RAY EXAM THORAC SPINE 3VWS: CPT

## 2025-07-08 ENCOUNTER — TELEPHONE (OUTPATIENT)
Age: 25
End: 2025-07-08

## 2025-07-08 NOTE — TELEPHONE ENCOUNTER
Patient called and stated he needs to have a physical completed for work. Patient also stated he needs a tuberculosis test done. Patient stated he would only like to see Erum FERRELL at this time. Patient stated he would like the first available appointment with her. Patient advised unfortunately I did not see availability within her schedule until August. Please review Erum FERRELL schedule and return patient call to discuss further.

## 2025-07-15 ENCOUNTER — OFFICE VISIT (OUTPATIENT)
Dept: FAMILY MEDICINE CLINIC | Facility: CLINIC | Age: 25
End: 2025-07-15
Payer: COMMERCIAL

## 2025-07-15 VITALS
SYSTOLIC BLOOD PRESSURE: 116 MMHG | DIASTOLIC BLOOD PRESSURE: 76 MMHG | TEMPERATURE: 98.6 F | HEIGHT: 70 IN | WEIGHT: 140 LBS | OXYGEN SATURATION: 98 % | HEART RATE: 84 BPM | BODY MASS INDEX: 20.04 KG/M2

## 2025-07-15 DIAGNOSIS — Z11.4 SCREENING FOR HIV (HUMAN IMMUNODEFICIENCY VIRUS): ICD-10-CM

## 2025-07-15 DIAGNOSIS — Z13.220 SCREENING FOR LIPID DISORDERS: ICD-10-CM

## 2025-07-15 DIAGNOSIS — Z11.1 SCREENING-PULMONARY TB: ICD-10-CM

## 2025-07-15 DIAGNOSIS — Z23 ENCOUNTER FOR IMMUNIZATION: ICD-10-CM

## 2025-07-15 DIAGNOSIS — Z23 NEED FOR TDAP VACCINATION: ICD-10-CM

## 2025-07-15 DIAGNOSIS — Z11.59 NEED FOR HEPATITIS C SCREENING TEST: ICD-10-CM

## 2025-07-15 DIAGNOSIS — Z00.00 ENCOUNTER FOR WELL ADULT EXAM WITHOUT ABNORMAL FINDINGS: Primary | ICD-10-CM

## 2025-07-15 PROCEDURE — 90715 TDAP VACCINE 7 YRS/> IM: CPT

## 2025-07-15 PROCEDURE — 90471 IMMUNIZATION ADMIN: CPT

## 2025-07-15 PROCEDURE — 99395 PREV VISIT EST AGE 18-39: CPT

## 2025-07-17 LAB
HIV 1+2 AB+HIV1 P24 AG SERPL QL IA: NORMAL
HIV 1+2 AB+HIV1 P24 AG SERPL QL IA: NORMAL

## 2025-07-20 LAB
CHOLEST SERPL-MCNC: 127 MG/DL
CHOLEST/HDLC SERPL: 3.3 (CALC)
HCV AB SERPL QL IA: NORMAL
HDLC SERPL-MCNC: 39 MG/DL
HIV 1+2 AB+HIV1 P24 AG SERPL QL IA: NORMAL
LDLC SERPL CALC-MCNC: 67 MG/DL (CALC)
M TB IFN-G CD4+ BCKGRND COR BLD-ACNC: 0 IU/ML
M TB IFN-G CD4+ BCKGRND COR BLD-ACNC: 0 IU/ML
M TB IFN-G CD4+ T-CELLS BLD-ACNC: 0.01 IU/ML
M TB TUBERC IFN-G BLD QL: NEGATIVE
M TB TUBERC IGNF/MITOGEN IGNF CONTROL: 9.8 IU/ML
NONHDLC SERPL-MCNC: 88 MG/DL (CALC)
TRIGL SERPL-MCNC: 127 MG/DL

## 2025-07-21 ENCOUNTER — RESULTS FOLLOW-UP (OUTPATIENT)
Dept: FAMILY MEDICINE CLINIC | Facility: CLINIC | Age: 25
End: 2025-07-21